# Patient Record
Sex: MALE | Race: WHITE | Employment: UNEMPLOYED | URBAN - METROPOLITAN AREA
[De-identification: names, ages, dates, MRNs, and addresses within clinical notes are randomized per-mention and may not be internally consistent; named-entity substitution may affect disease eponyms.]

---

## 2019-04-01 ENCOUNTER — HOSPITAL ENCOUNTER (INPATIENT)
Age: 49
LOS: 4 days | Discharge: HOME | DRG: 603 | End: 2019-04-05
Attending: EMERGENCY MEDICINE | Admitting: INTERNAL MEDICINE
Payer: MEDICARE

## 2019-04-01 DIAGNOSIS — L03.119 CELLULITIS OF LOWER EXTREMITY, UNSPECIFIED LATERALITY: Primary | ICD-10-CM

## 2019-04-01 DIAGNOSIS — E87.20 LACTIC ACIDOSIS: ICD-10-CM

## 2019-04-01 DIAGNOSIS — K70.31 ALCOHOLIC CIRRHOSIS OF LIVER WITH ASCITES (HCC): ICD-10-CM

## 2019-04-01 DIAGNOSIS — R60.0 BILATERAL LOWER EXTREMITY EDEMA: ICD-10-CM

## 2019-04-01 PROBLEM — L03.116 BILATERAL LOWER LEG CELLULITIS: Status: ACTIVE | Noted: 2019-04-01

## 2019-04-01 PROBLEM — L03.115 BILATERAL LOWER LEG CELLULITIS: Status: ACTIVE | Noted: 2019-04-01

## 2019-04-01 PROBLEM — K74.60 LIVER CIRRHOSIS (HCC): Status: ACTIVE | Noted: 2019-04-01

## 2019-04-01 LAB
A/G RATIO: 0.5 (ref 1.1–2.2)
ALBUMIN SERPL-MCNC: 2.2 G/DL (ref 3.4–5)
ALP BLD-CCNC: 102 U/L (ref 40–129)
ALT SERPL-CCNC: 49 U/L (ref 10–40)
AMMONIA: 47 UMOL/L (ref 16–60)
ANION GAP SERPL CALCULATED.3IONS-SCNC: 7 MMOL/L (ref 3–16)
AST SERPL-CCNC: 82 U/L (ref 15–37)
BASOPHILS ABSOLUTE: 0.1 K/UL (ref 0–0.2)
BASOPHILS RELATIVE PERCENT: 0.9 %
BILIRUB SERPL-MCNC: 1.6 MG/DL (ref 0–1)
BUN BLDV-MCNC: 6 MG/DL (ref 7–20)
CALCIUM SERPL-MCNC: 8.1 MG/DL (ref 8.3–10.6)
CHLORIDE BLD-SCNC: 108 MMOL/L (ref 99–110)
CO2: 24 MMOL/L (ref 21–32)
CREAT SERPL-MCNC: <0.5 MG/DL (ref 0.9–1.3)
EOSINOPHILS ABSOLUTE: 0.1 K/UL (ref 0–0.6)
EOSINOPHILS RELATIVE PERCENT: 1.3 %
GFR AFRICAN AMERICAN: >60
GFR NON-AFRICAN AMERICAN: >60
GLOBULIN: 4.5 G/DL
GLUCOSE BLD-MCNC: 129 MG/DL (ref 70–99)
HCT VFR BLD CALC: 38.1 % (ref 40.5–52.5)
HEMOGLOBIN: 12.1 G/DL (ref 13.5–17.5)
LACTIC ACID, SEPSIS: 1.8 MMOL/L (ref 0.4–1.9)
LACTIC ACID, SEPSIS: 2.7 MMOL/L (ref 0.4–1.9)
LYMPHOCYTES ABSOLUTE: 0.5 K/UL (ref 1–5.1)
LYMPHOCYTES RELATIVE PERCENT: 8.6 %
MCH RBC QN AUTO: 27.6 PG (ref 26–34)
MCHC RBC AUTO-ENTMCNC: 31.7 G/DL (ref 31–36)
MCV RBC AUTO: 87.2 FL (ref 80–100)
MONOCYTES ABSOLUTE: 0.8 K/UL (ref 0–1.3)
MONOCYTES RELATIVE PERCENT: 13.7 %
NEUTROPHILS ABSOLUTE: 4.6 K/UL (ref 1.7–7.7)
NEUTROPHILS RELATIVE PERCENT: 75.5 %
PDW BLD-RTO: 19.2 % (ref 12.4–15.4)
PLATELET # BLD: 108 K/UL (ref 135–450)
PMV BLD AUTO: 10.6 FL (ref 5–10.5)
POTASSIUM SERPL-SCNC: 4.5 MMOL/L (ref 3.5–5.1)
PRO-BNP: 89 PG/ML (ref 0–124)
RBC # BLD: 4.37 M/UL (ref 4.2–5.9)
SODIUM BLD-SCNC: 139 MMOL/L (ref 136–145)
TOTAL PROTEIN: 6.7 G/DL (ref 6.4–8.2)
WBC # BLD: 6 K/UL (ref 4–11)

## 2019-04-01 PROCEDURE — 6360000002 HC RX W HCPCS: Performed by: EMERGENCY MEDICINE

## 2019-04-01 PROCEDURE — 96365 THER/PROPH/DIAG IV INF INIT: CPT

## 2019-04-01 PROCEDURE — 96375 TX/PRO/DX INJ NEW DRUG ADDON: CPT

## 2019-04-01 PROCEDURE — 36415 COLL VENOUS BLD VENIPUNCTURE: CPT

## 2019-04-01 PROCEDURE — 6370000000 HC RX 637 (ALT 250 FOR IP): Performed by: INTERNAL MEDICINE

## 2019-04-01 PROCEDURE — 1200000000 HC SEMI PRIVATE

## 2019-04-01 PROCEDURE — 99284 EMERGENCY DEPT VISIT MOD MDM: CPT

## 2019-04-01 PROCEDURE — 83880 ASSAY OF NATRIURETIC PEPTIDE: CPT

## 2019-04-01 PROCEDURE — 85025 COMPLETE CBC W/AUTO DIFF WBC: CPT

## 2019-04-01 PROCEDURE — 6370000000 HC RX 637 (ALT 250 FOR IP): Performed by: EMERGENCY MEDICINE

## 2019-04-01 PROCEDURE — 83605 ASSAY OF LACTIC ACID: CPT

## 2019-04-01 PROCEDURE — 82140 ASSAY OF AMMONIA: CPT

## 2019-04-01 PROCEDURE — 87040 BLOOD CULTURE FOR BACTERIA: CPT

## 2019-04-01 PROCEDURE — 96367 TX/PROPH/DG ADDL SEQ IV INF: CPT

## 2019-04-01 PROCEDURE — 2580000003 HC RX 258: Performed by: EMERGENCY MEDICINE

## 2019-04-01 PROCEDURE — 80053 COMPREHEN METABOLIC PANEL: CPT

## 2019-04-01 PROCEDURE — 6360000002 HC RX W HCPCS: Performed by: INTERNAL MEDICINE

## 2019-04-01 PROCEDURE — 2580000003 HC RX 258: Performed by: INTERNAL MEDICINE

## 2019-04-01 RX ORDER — SODIUM CHLORIDE 0.9 % (FLUSH) 0.9 %
10 SYRINGE (ML) INJECTION PRN
Status: DISCONTINUED | OUTPATIENT
Start: 2019-04-01 | End: 2019-04-05 | Stop reason: HOSPADM

## 2019-04-01 RX ORDER — NADOLOL 20 MG/1
20 TABLET ORAL DAILY
Status: DISCONTINUED | OUTPATIENT
Start: 2019-04-02 | End: 2019-04-05 | Stop reason: HOSPADM

## 2019-04-01 RX ORDER — FUROSEMIDE 40 MG/1
40 TABLET ORAL DAILY
Status: DISCONTINUED | OUTPATIENT
Start: 2019-04-02 | End: 2019-04-02

## 2019-04-01 RX ORDER — THIAMINE MONONITRATE (VIT B1) 100 MG
100 TABLET ORAL DAILY
Status: DISCONTINUED | OUTPATIENT
Start: 2019-04-02 | End: 2019-04-05 | Stop reason: HOSPADM

## 2019-04-01 RX ORDER — SPIRONOLACTONE 100 MG/1
100 TABLET, FILM COATED ORAL DAILY
Status: ON HOLD | COMMUNITY
End: 2019-04-04 | Stop reason: SDUPTHER

## 2019-04-01 RX ORDER — LACTULOSE 10 G/15ML
20 SOLUTION ORAL 3 TIMES DAILY
Status: ON HOLD | COMMUNITY
End: 2019-04-04 | Stop reason: SDUPTHER

## 2019-04-01 RX ORDER — PANTOPRAZOLE SODIUM 40 MG/1
40 TABLET, DELAYED RELEASE ORAL DAILY
Status: DISCONTINUED | OUTPATIENT
Start: 2019-04-02 | End: 2019-04-05 | Stop reason: HOSPADM

## 2019-04-01 RX ORDER — NADOLOL 20 MG/1
20 TABLET ORAL DAILY
Status: ON HOLD | COMMUNITY
End: 2019-04-04 | Stop reason: SDUPTHER

## 2019-04-01 RX ORDER — TRAMADOL HYDROCHLORIDE 50 MG/1
100 TABLET ORAL EVERY 6 HOURS PRN
Status: DISCONTINUED | OUTPATIENT
Start: 2019-04-01 | End: 2019-04-02

## 2019-04-01 RX ORDER — PANTOPRAZOLE SODIUM 40 MG/1
40 TABLET, DELAYED RELEASE ORAL DAILY
Status: ON HOLD | COMMUNITY
End: 2019-04-04 | Stop reason: SDUPTHER

## 2019-04-01 RX ORDER — SPIRONOLACTONE 25 MG/1
100 TABLET ORAL DAILY
Status: DISCONTINUED | OUTPATIENT
Start: 2019-04-02 | End: 2019-04-05 | Stop reason: HOSPADM

## 2019-04-01 RX ORDER — SODIUM CHLORIDE 0.9 % (FLUSH) 0.9 %
10 SYRINGE (ML) INJECTION EVERY 12 HOURS SCHEDULED
Status: DISCONTINUED | OUTPATIENT
Start: 2019-04-01 | End: 2019-04-05 | Stop reason: HOSPADM

## 2019-04-01 RX ORDER — LACTULOSE 10 G/15ML
20 SOLUTION ORAL 3 TIMES DAILY
Status: DISCONTINUED | OUTPATIENT
Start: 2019-04-01 | End: 2019-04-05 | Stop reason: HOSPADM

## 2019-04-01 RX ORDER — FUROSEMIDE 40 MG/1
40 TABLET ORAL DAILY
Status: ON HOLD | COMMUNITY
End: 2019-04-04 | Stop reason: SDUPTHER

## 2019-04-01 RX ORDER — ACETAMINOPHEN 325 MG/1
650 TABLET ORAL EVERY 4 HOURS PRN
Status: DISCONTINUED | OUTPATIENT
Start: 2019-04-01 | End: 2019-04-05 | Stop reason: HOSPADM

## 2019-04-01 RX ORDER — OXYCODONE HYDROCHLORIDE AND ACETAMINOPHEN 5; 325 MG/1; MG/1
1 TABLET ORAL ONCE
Status: COMPLETED | OUTPATIENT
Start: 2019-04-01 | End: 2019-04-01

## 2019-04-01 RX ORDER — ONDANSETRON 2 MG/ML
4 INJECTION INTRAMUSCULAR; INTRAVENOUS EVERY 4 HOURS PRN
Status: DISCONTINUED | OUTPATIENT
Start: 2019-04-01 | End: 2019-04-05 | Stop reason: HOSPADM

## 2019-04-01 RX ORDER — THIAMINE MONONITRATE (VIT B1) 100 MG
100 TABLET ORAL DAILY
Status: ON HOLD | COMMUNITY
End: 2019-04-04 | Stop reason: SDUPTHER

## 2019-04-01 RX ORDER — TRAMADOL HYDROCHLORIDE 50 MG/1
50 TABLET ORAL EVERY 6 HOURS PRN
Status: DISCONTINUED | OUTPATIENT
Start: 2019-04-01 | End: 2019-04-02

## 2019-04-01 RX ORDER — FUROSEMIDE 10 MG/ML
20 INJECTION INTRAMUSCULAR; INTRAVENOUS ONCE
Status: COMPLETED | OUTPATIENT
Start: 2019-04-01 | End: 2019-04-01

## 2019-04-01 RX ADMIN — HYDROMORPHONE HYDROCHLORIDE 0.5 MG: 1 INJECTION, SOLUTION INTRAMUSCULAR; INTRAVENOUS; SUBCUTANEOUS at 17:52

## 2019-04-01 RX ADMIN — TRAMADOL HYDROCHLORIDE 100 MG: 50 TABLET ORAL at 19:37

## 2019-04-01 RX ADMIN — VANCOMYCIN HYDROCHLORIDE 1250 MG: 5 INJECTION, POWDER, LYOPHILIZED, FOR SOLUTION INTRAVENOUS at 17:17

## 2019-04-01 RX ADMIN — LACTULOSE 20 G: 20 SOLUTION ORAL at 21:40

## 2019-04-01 RX ADMIN — FUROSEMIDE 20 MG: 10 INJECTION, SOLUTION INTRAMUSCULAR; INTRAVENOUS at 15:51

## 2019-04-01 RX ADMIN — OXYCODONE HYDROCHLORIDE AND ACETAMINOPHEN 1 TABLET: 5; 325 TABLET ORAL at 15:51

## 2019-04-01 RX ADMIN — RIFAXIMIN 550 MG: 550 TABLET ORAL at 21:40

## 2019-04-01 RX ADMIN — PIPERACILLIN SODIUM,TAZOBACTAM SODIUM 3.38 G: 3; .375 INJECTION, POWDER, FOR SOLUTION INTRAVENOUS at 21:40

## 2019-04-01 RX ADMIN — CEFTRIAXONE 1 G: 1 INJECTION, POWDER, FOR SOLUTION INTRAMUSCULAR; INTRAVENOUS at 15:51

## 2019-04-01 RX ADMIN — Medication 10 ML: at 21:40

## 2019-04-01 ASSESSMENT — PAIN DESCRIPTION - LOCATION
LOCATION: LEG

## 2019-04-01 ASSESSMENT — PAIN DESCRIPTION - ORIENTATION
ORIENTATION: RIGHT;LEFT
ORIENTATION: LEFT;RIGHT;LOWER
ORIENTATION: RIGHT;LEFT

## 2019-04-01 ASSESSMENT — PAIN - FUNCTIONAL ASSESSMENT
PAIN_FUNCTIONAL_ASSESSMENT: PREVENTS OR INTERFERES WITH ALL ACTIVE AND SOME PASSIVE ACTIVITIES
PAIN_FUNCTIONAL_ASSESSMENT: PREVENTS OR INTERFERES SOME ACTIVE ACTIVITIES AND ADLS

## 2019-04-01 ASSESSMENT — PAIN DESCRIPTION - FREQUENCY
FREQUENCY: CONTINUOUS

## 2019-04-01 ASSESSMENT — PAIN DESCRIPTION - ONSET
ONSET: ON-GOING
ONSET: GRADUAL
ONSET: ON-GOING
ONSET: ON-GOING

## 2019-04-01 ASSESSMENT — PAIN SCALES - GENERAL
PAINLEVEL_OUTOF10: 10
PAINLEVEL_OUTOF10: 6
PAINLEVEL_OUTOF10: 10
PAINLEVEL_OUTOF10: 10
PAINLEVEL_OUTOF10: 7
PAINLEVEL_OUTOF10: 7

## 2019-04-01 ASSESSMENT — PAIN DESCRIPTION - PAIN TYPE
TYPE: ACUTE PAIN

## 2019-04-01 ASSESSMENT — PAIN DESCRIPTION - PROGRESSION
CLINICAL_PROGRESSION: GRADUALLY WORSENING
CLINICAL_PROGRESSION: GRADUALLY IMPROVING
CLINICAL_PROGRESSION: GRADUALLY WORSENING

## 2019-04-01 ASSESSMENT — PAIN DESCRIPTION - DESCRIPTORS
DESCRIPTORS: BURNING
DESCRIPTORS: BURNING;PINS AND NEEDLES
DESCRIPTORS: BURNING

## 2019-04-01 NOTE — PROGRESS NOTES
Medication Reconciliation    List of medications patient is currently taking is complete. Source of information: 1. Conversation with patient at bedside                                       2. EPIC records      Allergies  Patient has no known allergies. Notes regarding home medications:   1. Patient has not taken any of his home medications for the past several weeks.       Temo Bentley PharmD, BCPS  4/1/2019 4:52 PM

## 2019-04-01 NOTE — ED TRIAGE NOTES
Pt c/o lower leg pain pt has blistering skin from the knee down on both legs. Per pt his legs have been this way for around a month or month and a half. Pt does not know how this condition started but states that it is getting worse. Pt states that he does not have a PCP. Pt is currently alert and oriented x 4. Pt is answering questions approprietly, in full sentences without difficulty or trouble breathing. Pt is currently resting in bed in supine position. Pt VS are as noted.

## 2019-04-01 NOTE — ED PROVIDER NOTES
Triage Chief Complaint:   Leg Pain (Patient states he has psoriasis and for the last month he has had increased pain to bilateral legs with seeping from wounds.  )    [place]:  Valeriano Lagunas is a 46 y.o. male that presents with gradually increasing bilateral peripheral edema of the lower extremities with erythema and open wounds on both legs. He has a history of hepatic cirrhosis and does have chronic edema of the lower extremity is, but has been getting worse. He is having oozing from bilateral lower extremities with open sores. The skin is now burning and he has severe pain in both legs diffusely. He states he has not been taking his home medications. He does have a history of hyperammonemia but states that he has not had any confusion or balance problems, which are the symptoms that he had previously. He denies nausea or vomiting, chest pain, shortness of breath, fever, cough, or any other new complaints at this time. ROS:  At least 10 systems reviewed and otherwise acutely negative except as in the [address] Past Medical History:   Diagnosis Date    Cirrhosis [facility])      Past Surgical History:   Procedure Laterality Date    HERNIA REPAIR       History reviewed. No pertinent family history.   Social History     Socioeconomic History    Marital status: Single     Spouse name: Not on file    Number of children: Not on file    Years of education: Not on file    Highest education level: Not on file   Occupational History    Not on file   Social Needs    Financial resource strain: Not on file    Food insecurity:     Worry: Not on file     Inability: Not on file    Transportation needs:     Medical: Not on file     Non-medical: Not on file   Tobacco Use    Smoking status: Former Smoker    Smokeless tobacco: Never Used   Substance and Sexual Activity    Alcohol use: No    Drug use: No    Sexual activity: Not on file   Lifestyle    Physical activity:     Days per week: Not on file     Minutes per ondansetron (ZOFRAN) injection 4 mg  4 mg Intravenous Q4H PRN Christen Loya MD        [START ON 4/2/2019] enoxaparin (LOVENOX) injection 40 mg  40 mg Subcutaneous Daily Christen Loya MD        acetaminophen (TYLENOL) tablet 650 mg  650 mg Oral Q4H PRN Christen Loya MD        traMADol Dulce Maria Peto) tablet 50 mg  50 mg Oral Q6H PRN Christen Loya MD        Or    traMADol Dulce Maria Peto) tablet 100 mg  100 mg Oral Q6H PRN Christen Loya MD   100 mg at 04/01/19 1937    vancomycin (VANCOCIN) intermittent dosing (placeholder)   Other RX Placeholder Christen Loya MD        [START ON 4/2/2019] vancomycin (VANCOCIN) 1250 mg in dextrose 5 % 250 mL IVPB  1,250 mg Intravenous Q8H Christen Loya MD         No Known Allergies     Tamiko Coma Scale  Eye Opening: Spontaneous  Best Verbal Response: Oriented  Best Motor Response: Obeys commands  Tamiko Coma Scale Score: 15      Nursing Notes Reviewed    Physical Exam:  Vitals:    04/01/19 2049   BP: 139/69   Pulse: 84   Resp: 16   Temp: 97.6 °F (36.4 °C)   SpO2: 98%       GENERAL APPEARANCE: Awake and alert. Cooperative. He appears uncomfortable and frequently grimaces. No other distress. HEAD: Normocephalic. Atraumatic. EYES: EOM's grossly intact. Sclera anicteric. ENT: Mucous membranes are moist. Tolerates saliva. No trismus. NECK: Supple. No meningismus. Trachea midline. HEART: RRR. Radial pulses 2+. LUNGS: Respirations unlabored. CTAB  ABDOMEN: Soft. Non-tender. No guarding or rebound. EXTREMITIES: Severe pitting edema bilateral extremities. There are diffuse excoriation areas of the lower extremities with cellulitis and erythema. Skin is hot to the touch. There is some serous fluid draining from the skin. Sstrong posterior tibial pulse bilaterally. SKIN: Warm and dry. Areas of cellulitis and excoriation with scab lesions as mentioned above. NEUROLOGICAL: No gross facial drooping. Moves all 4 extremities spontaneously. PSYCHIATRIC: Normal mood.     I have reviewed and interpreted all of the currently available lab results from this visit (if applicable):  Results for orders placed or performed during the hospital encounter of 04/01/19   Ammonia   Result Value Ref Range    Ammonia 47 16 - 60 umol/L   CBC Auto Differential   Result Value Ref Range    WBC 6.0 4.0 - 11.0 K/uL    RBC 4.37 4.20 - 5.90 M/uL    Hemoglobin 12.1 (L) 13.5 - 17.5 g/dL    Hematocrit 38.1 (L) 40.5 - 52.5 %    MCV 87.2 80.0 - 100.0 fL    MCH 27.6 26.0 - 34.0 pg    MCHC 31.7 31.0 - 36.0 g/dL    RDW 19.2 (H) 12.4 - 15.4 %    Platelets 983 (L) 123 - 450 K/uL    MPV 10.6 (H) 5.0 - 10.5 fL    Neutrophils % 75.5 %    Lymphocytes % 8.6 %    Monocytes % 13.7 %    Eosinophils % 1.3 %    Basophils % 0.9 %    Neutrophils # 4.6 1.7 - 7.7 K/uL    Lymphocytes # 0.5 (L) 1.0 - 5.1 K/uL    Monocytes # 0.8 0.0 - 1.3 K/uL    Eosinophils # 0.1 0.0 - 0.6 K/uL    Basophils # 0.1 0.0 - 0.2 K/uL   Comprehensive Metabolic Panel   Result Value Ref Range    Sodium 139 136 - 145 mmol/L    Potassium 4.5 3.5 - 5.1 mmol/L    Chloride 108 99 - 110 mmol/L    CO2 24 21 - 32 mmol/L    Anion Gap 7 3 - 16    Glucose 129 (H) 70 - 99 mg/dL    BUN 6 (L) 7 - 20 mg/dL    CREATININE <0.5 (L) 0.9 - 1.3 mg/dL    GFR Non-African American >60 >60    GFR African American >60 >60    Calcium 8.1 (L) 8.3 - 10.6 mg/dL    Total Protein 6.7 6.4 - 8.2 g/dL    Alb 2.2 (L) 3.4 - 5.0 g/dL    Albumin/Globulin Ratio 0.5 (L) 1.1 - 2.2    Total Bilirubin 1.6 (H) 0.0 - 1.0 mg/dL    Alkaline Phosphatase 102 40 - 129 U/L    ALT 49 (H) 10 - 40 U/L    AST 82 (H) 15 - 37 U/L    Globulin 4.5 g/dL   Brain Natriuretic Peptide   Result Value Ref Range    Pro-BNP 89 0 - 124 pg/mL   Lactate, Sepsis   Result Value Ref Range    Lactic Acid, Sepsis 2.7 (H) 0.4 - 1.9 mmol/L   Lactate, Sepsis   Result Value Ref Range    Lactic Acid, Sepsis 1.8 0.4 - 1.9 mmol/L        Radiographs (if obtained):  [] The following radiograph was interpreted by myself in the absence of a radiologist:  [] Radiologist's Report Reviewed:      EKG (if obtained): (All EKG's are interpreted by myself in the absence of a cardiologist)      MDM:  Differential diagnosis: Likely cellulitis with edema from cirrhosis versus renal insufficiency. I do not suspect DVT as bilateral extremities are involved with evidence of cellulitis and diffuse erythema. Patient has significant cellulitis and bruising of bilateral lower extremities from the lower legs almost up to the knees bilaterally. It is very hot to the touch and tender. Labs and IV vancomycin and Rocephin ordered. Old records reviewed. Labsreviewed and results discussed with patient. Pain was not controllable Percocets of Dilaudid was ordered. Lactic acid is elevated to 2.7. He does have cellulitis, but has no leukocytosis or other sepsis/surgical criteria. Given his significant fluid overload status we will hold IV fluids at this time. Patient was given scripts for the following medications. I counseled patient how to take these medications. Current Discharge Medication List            Clinical Impression:  1. Cellulitis of lower extremity, unspecified laterality    2. Lactic acidosis    3.  Bilateral lower extremity edema       (Please note that portions of this note may have been completed with a voice recognition program. Efforts were made to edit the dictations but occasionally words are mis-transcribed.)    MD Maximino Walton MD  04/01/19 4183

## 2019-04-01 NOTE — H&P
Hospital Medicine  History and Physical    PCP: Erlinda Ross MD  Patient Name: Priyank Patel    Date of Service: Pt seen/examined on 04/01/2019 and Admitted to Inpatient with expected LOS greater than two midnights due to medical therapy    CHIEF COMPLAINT:  Pt c/o pain, erythema bilateral lower extremities  HISTORY OF PRESENT ILLNESS: Patient is a 78-year-old man with a history of liver cirrhosis who presents to the emergency room for evaluation following a 4 to 6 week history of increasing blistering, swelling and erythema of his bilateral lower extremities. He states that he has used Neosporin and peroxide and neither have helped. In the emergency room he was found to have bilateral lower extremity cellulitis and is being admitted for further evaluation and treatment. Associated signs and symptoms do not include fever or chills, nausea, vomiting, abdominal pain, hemoptysis, hematochezia, diarrhea, constipation or urinary symptoms. Past Medical History:        Diagnosis Date    Cirrhosis [facility])        Past Surgical History:        Procedure Laterality Date    HERNIA REPAIR         Allergies:  Patient has no known allergies. Medications Prior to Admission:    Prior to Admission medications    Medication Sig Start Date End Date Taking?  Authorizing Provider   lactulose (CHRONULAC) 10 GM/15ML solution Take 20 g by mouth 3 times daily   Yes Historical Provider, MD   pantoprazole (PROTONIX) 40 MG tablet Take 40 mg by mouth daily   Yes Historical Provider, MD   nadolol (CORGARD) 20 MG tablet Take 20 mg by mouth daily   Yes Historical Provider, MD   vitamin B-1 (THIAMINE) 100 MG tablet Take 100 mg by mouth daily   Yes Historical Provider, MD   spironolactone (ALDACTONE) 100 MG tablet Take 100 mg by mouth daily   Yes Historical Provider, MD   rifaximin (XIFAXAN) 550 MG tablet Take 550 mg by mouth 2 times daily   Yes Historical Provider, MD   furosemide (LASIX) 40 MG tablet Take 40 mg by mouth daily   Yes Historical Provider, MD       Family History:   Family history is negative for accelerated coronary artery disease, diabetes or malignancies. Social History:   TOBACCO:   reports that he has quit smoking. He has never used smokeless tobacco.  ETOH:   reports that he does not drink alcohol. OCCUPATION:      REVIEW OF SYSTEMS:  A full review of systems was performed and is negative except for that which appears in the HPI    Physical Exam:    Vitals: BP (!) 142/78   Pulse 80   Temp 98.5 °F (36.9 °C) (Oral)   Resp 15   Ht 5' 10\" (1.778 m)   Wt 185 lb (83.9 kg)   SpO2 100%   BMI 26.54 kg/m²   General appearance: WD/WN 46y.o. year-old  male who is alert, appears stated age and is cooperative  HEENT: Head: Normocephalic, no lesions, without obvious abnormality. Eye: Normal external eye, conjunctiva, lids cornea, SUELLEN. Ears: Normal external ears. Non-tender. Nose: Normal external nose, mucus membranes and septum. Pharynx: Dental Hygiene adequate. Normal buccal mucosa. Normal pharynx. Neck: no adenopathy, no carotid bruit, no JVD, supple, symmetrical, trachea midline and thyroid not enlarged, symmetric, no tenderness/mass/nodules  Lungs: clear to auscultation bilaterally and no use of accessory muscles. Heart: regular rate and rhythm, S1, S2 normal, no murmur, click, rub or gallop and normal apical impulse  Abdomen: soft, non-tender; bowel sounds normal; no masses,  no organomegaly  Extremities: Swelling, blistering, erythema bilateral lower extremities, otherwise Homans sign is negative, no sign of DVT. Capillary Refill: Acceptable < 3 seconds  Peripheral Pulses: +3 easily felt, not easily obliterated with pressures  Skin: As above, otherwise skin color, texture, turgor normal. No rashes or lesions on exposed skin  Neurologic: Neurovascularly intact without any focal sensory/motor deficits. Cranial nerves: II-XII intact, grossly non-focal. Gait was not tested.   Psychiatric: Alert and oriented, thought content appropriate, normal insight    CBC:   Recent Labs     04/01/19  1539   WBC 6.0   HGB 12.1*   *     BMP:    Recent Labs     04/01/19  1539      K 4.5      CO2 24   BUN 6*   CREATININE <0.5*   GLUCOSE 129*     Troponin: No results for input(s): TROPONINI in the last 72 hours. PT/INR:  No results found for: PTINR  U/A:    Lab Results   Component Value Date    LEUKOCYTESUR Negative 06/08/2015    RBCUA 11 06/08/2015    SPECGRAV 1.012 06/08/2015    UROBILINOGEN 1.0 06/08/2015    BILIRUBINUR Negative 06/08/2015    BILIRUBINUR Moderate 07/25/2011    BLOODU SMALL 06/08/2015    GLUCOSEU Negative 06/08/2015    GLUCOSEU Negative 07/25/2011    PROTEINU Negative 06/08/2015         Assessment:   Principal Problem:    Bilateral lower leg cellulitis  Active Problems:    Liver cirrhosis ([address]  Resolved Problems:    * No resolved hospital problems. *      Plan:       Bilateral lower leg cellulitis - Pt started on Vancomycin and Zosyn. Blood cultures x 2 and ID consulted    Liver cirrhosis (HCC) continue Rifaximin, Lactulose, Nadolol        DVT Prophylaxis: Lovenox  Diet: No diet orders on file  Code Status: No Order  (Advanced care planning has been discussed with patient and/or responsible family member and is reflected in the code status. Further orders associated with this have been entered if appropriate)    Disposition: Anticipate that patient will remain in the hospital for 2 to 3+ days depending on further evaluation and clinical course.      Gabriela Colorado MD

## 2019-04-02 LAB
ANION GAP SERPL CALCULATED.3IONS-SCNC: 6 MMOL/L (ref 3–16)
BASOPHILS ABSOLUTE: 0 K/UL (ref 0–0.2)
BASOPHILS RELATIVE PERCENT: 0.9 %
BUN BLDV-MCNC: 7 MG/DL (ref 7–20)
CALCIUM SERPL-MCNC: 7.1 MG/DL (ref 8.3–10.6)
CHLORIDE BLD-SCNC: 105 MMOL/L (ref 99–110)
CO2: 22 MMOL/L (ref 21–32)
CREAT SERPL-MCNC: <0.5 MG/DL (ref 0.9–1.3)
EOSINOPHILS ABSOLUTE: 0.1 K/UL (ref 0–0.6)
EOSINOPHILS RELATIVE PERCENT: 2.8 %
GFR AFRICAN AMERICAN: >60
GFR NON-AFRICAN AMERICAN: >60
GLUCOSE BLD-MCNC: 124 MG/DL (ref 70–99)
HCT VFR BLD CALC: 33.7 % (ref 40.5–52.5)
HEMOGLOBIN: 10.9 G/DL (ref 13.5–17.5)
LYMPHOCYTES ABSOLUTE: 0.5 K/UL (ref 1–5.1)
LYMPHOCYTES RELATIVE PERCENT: 13.4 %
MCH RBC QN AUTO: 28.5 PG (ref 26–34)
MCHC RBC AUTO-ENTMCNC: 32.5 G/DL (ref 31–36)
MCV RBC AUTO: 87.7 FL (ref 80–100)
MONOCYTES ABSOLUTE: 0.5 K/UL (ref 0–1.3)
MONOCYTES RELATIVE PERCENT: 14.3 %
NEUTROPHILS ABSOLUTE: 2.6 K/UL (ref 1.7–7.7)
NEUTROPHILS RELATIVE PERCENT: 68.6 %
PDW BLD-RTO: 19 % (ref 12.4–15.4)
PLATELET # BLD: 94 K/UL (ref 135–450)
PMV BLD AUTO: 10.5 FL (ref 5–10.5)
POTASSIUM REFLEX MAGNESIUM: 3.7 MMOL/L (ref 3.5–5.1)
RBC # BLD: 3.84 M/UL (ref 4.2–5.9)
SODIUM BLD-SCNC: 133 MMOL/L (ref 136–145)
WBC # BLD: 3.8 K/UL (ref 4–11)

## 2019-04-02 PROCEDURE — 6370000000 HC RX 637 (ALT 250 FOR IP): Performed by: INTERNAL MEDICINE

## 2019-04-02 PROCEDURE — 1200000000 HC SEMI PRIVATE

## 2019-04-02 PROCEDURE — 85025 COMPLETE CBC W/AUTO DIFF WBC: CPT

## 2019-04-02 PROCEDURE — 2580000003 HC RX 258: Performed by: INTERNAL MEDICINE

## 2019-04-02 PROCEDURE — 80048 BASIC METABOLIC PNL TOTAL CA: CPT

## 2019-04-02 PROCEDURE — 6360000002 HC RX W HCPCS: Performed by: NURSE PRACTITIONER

## 2019-04-02 PROCEDURE — 36415 COLL VENOUS BLD VENIPUNCTURE: CPT

## 2019-04-02 PROCEDURE — 6360000002 HC RX W HCPCS: Performed by: INTERNAL MEDICINE

## 2019-04-02 PROCEDURE — 94760 N-INVAS EAR/PLS OXIMETRY 1: CPT

## 2019-04-02 PROCEDURE — 89051 BODY FLUID CELL COUNT: CPT

## 2019-04-02 PROCEDURE — 6370000000 HC RX 637 (ALT 250 FOR IP): Performed by: NURSE PRACTITIONER

## 2019-04-02 PROCEDURE — 93970 EXTREMITY STUDY: CPT

## 2019-04-02 PROCEDURE — 99223 1ST HOSP IP/OBS HIGH 75: CPT | Performed by: INTERNAL MEDICINE

## 2019-04-02 RX ORDER — OXYCODONE HYDROCHLORIDE AND ACETAMINOPHEN 5; 325 MG/1; MG/1
2 TABLET ORAL EVERY 4 HOURS PRN
Status: DISCONTINUED | OUTPATIENT
Start: 2019-04-02 | End: 2019-04-05 | Stop reason: HOSPADM

## 2019-04-02 RX ORDER — KETOROLAC TROMETHAMINE 30 MG/ML
30 INJECTION, SOLUTION INTRAMUSCULAR; INTRAVENOUS ONCE
Status: COMPLETED | OUTPATIENT
Start: 2019-04-02 | End: 2019-04-02

## 2019-04-02 RX ORDER — HYDROCODONE BITARTRATE AND ACETAMINOPHEN 5; 325 MG/1; MG/1
1 TABLET ORAL EVERY 6 HOURS PRN
Status: DISCONTINUED | OUTPATIENT
Start: 2019-04-02 | End: 2019-04-02

## 2019-04-02 RX ORDER — KETOROLAC TROMETHAMINE 15 MG/ML
15 INJECTION, SOLUTION INTRAMUSCULAR; INTRAVENOUS ONCE
Status: DISCONTINUED | OUTPATIENT
Start: 2019-04-02 | End: 2019-04-02

## 2019-04-02 RX ORDER — GABAPENTIN 100 MG/1
100 CAPSULE ORAL 3 TIMES DAILY
Status: DISCONTINUED | OUTPATIENT
Start: 2019-04-02 | End: 2019-04-05 | Stop reason: HOSPADM

## 2019-04-02 RX ORDER — FUROSEMIDE 10 MG/ML
40 INJECTION INTRAMUSCULAR; INTRAVENOUS 2 TIMES DAILY
Status: DISCONTINUED | OUTPATIENT
Start: 2019-04-02 | End: 2019-04-03

## 2019-04-02 RX ADMIN — Medication 10 ML: at 21:17

## 2019-04-02 RX ADMIN — GABAPENTIN 100 MG: 100 CAPSULE ORAL at 12:20

## 2019-04-02 RX ADMIN — OXYCODONE AND ACETAMINOPHEN 2 TABLET: 5; 325 TABLET ORAL at 17:59

## 2019-04-02 RX ADMIN — TRAMADOL HYDROCHLORIDE 100 MG: 50 TABLET ORAL at 08:32

## 2019-04-02 RX ADMIN — LACTULOSE 20 G: 20 SOLUTION ORAL at 12:21

## 2019-04-02 RX ADMIN — Medication 100 MG: at 08:32

## 2019-04-02 RX ADMIN — HYDROCODONE BITARTRATE AND ACETAMINOPHEN 1 TABLET: 5; 325 TABLET ORAL at 13:25

## 2019-04-02 RX ADMIN — NADOLOL 20 MG: 20 TABLET ORAL at 08:44

## 2019-04-02 RX ADMIN — ENOXAPARIN SODIUM 40 MG: 40 INJECTION SUBCUTANEOUS at 08:32

## 2019-04-02 RX ADMIN — FUROSEMIDE 40 MG: 40 TABLET ORAL at 08:33

## 2019-04-02 RX ADMIN — RIFAXIMIN 550 MG: 550 TABLET ORAL at 21:16

## 2019-04-02 RX ADMIN — GABAPENTIN 100 MG: 100 CAPSULE ORAL at 21:16

## 2019-04-02 RX ADMIN — VANCOMYCIN HYDROCHLORIDE 1250 MG: 10 INJECTION, POWDER, LYOPHILIZED, FOR SOLUTION INTRAVENOUS at 04:39

## 2019-04-02 RX ADMIN — SPIRONOLACTONE 100 MG: 25 TABLET ORAL at 08:32

## 2019-04-02 RX ADMIN — PIPERACILLIN SODIUM,TAZOBACTAM SODIUM 3.38 G: 3; .375 INJECTION, POWDER, FOR SOLUTION INTRAVENOUS at 03:36

## 2019-04-02 RX ADMIN — LACTULOSE 20 G: 20 SOLUTION ORAL at 21:16

## 2019-04-02 RX ADMIN — Medication 10 ML: at 08:45

## 2019-04-02 RX ADMIN — VANCOMYCIN HYDROCHLORIDE 1000 MG: 1 INJECTION, POWDER, LYOPHILIZED, FOR SOLUTION INTRAVENOUS at 18:38

## 2019-04-02 RX ADMIN — FUROSEMIDE 40 MG: 10 INJECTION, SOLUTION INTRAMUSCULAR; INTRAVENOUS at 12:19

## 2019-04-02 RX ADMIN — CEFTRIAXONE 2 G: 2 INJECTION, POWDER, FOR SOLUTION INTRAMUSCULAR; INTRAVENOUS at 16:56

## 2019-04-02 RX ADMIN — FUROSEMIDE 40 MG: 10 INJECTION, SOLUTION INTRAMUSCULAR; INTRAVENOUS at 16:56

## 2019-04-02 RX ADMIN — LACTULOSE 20 G: 20 SOLUTION ORAL at 08:32

## 2019-04-02 RX ADMIN — PIPERACILLIN SODIUM,TAZOBACTAM SODIUM 3.38 G: 3; .375 INJECTION, POWDER, FOR SOLUTION INTRAVENOUS at 08:34

## 2019-04-02 RX ADMIN — PANTOPRAZOLE SODIUM 40 MG: 40 TABLET, DELAYED RELEASE ORAL at 08:33

## 2019-04-02 RX ADMIN — KETOROLAC TROMETHAMINE 30 MG: 30 INJECTION, SOLUTION INTRAMUSCULAR; INTRAVENOUS at 21:16

## 2019-04-02 RX ADMIN — RIFAXIMIN 550 MG: 550 TABLET ORAL at 08:32

## 2019-04-02 RX ADMIN — TRAMADOL HYDROCHLORIDE 100 MG: 50 TABLET ORAL at 01:41

## 2019-04-02 ASSESSMENT — PAIN DESCRIPTION - LOCATION
LOCATION: LEG

## 2019-04-02 ASSESSMENT — PAIN DESCRIPTION - ONSET
ONSET: GRADUAL
ONSET: GRADUAL
ONSET: AWAKENED FROM SLEEP

## 2019-04-02 ASSESSMENT — PAIN SCALES - GENERAL
PAINLEVEL_OUTOF10: 3
PAINLEVEL_OUTOF10: 9
PAINLEVEL_OUTOF10: 8
PAINLEVEL_OUTOF10: 4
PAINLEVEL_OUTOF10: 9
PAINLEVEL_OUTOF10: 1
PAINLEVEL_OUTOF10: 9
PAINLEVEL_OUTOF10: 3
PAINLEVEL_OUTOF10: 8
PAINLEVEL_OUTOF10: 6
PAINLEVEL_OUTOF10: 6
PAINLEVEL_OUTOF10: 2
PAINLEVEL_OUTOF10: 6
PAINLEVEL_OUTOF10: 4

## 2019-04-02 ASSESSMENT — PAIN DESCRIPTION - DESCRIPTORS
DESCRIPTORS: BURNING
DESCRIPTORS: BURNING
DESCRIPTORS: CONSTANT

## 2019-04-02 ASSESSMENT — PAIN DESCRIPTION - PAIN TYPE
TYPE: ACUTE PAIN

## 2019-04-02 ASSESSMENT — PAIN DESCRIPTION - PROGRESSION
CLINICAL_PROGRESSION: GRADUALLY WORSENING
CLINICAL_PROGRESSION: GRADUALLY WORSENING
CLINICAL_PROGRESSION: GRADUALLY IMPROVING

## 2019-04-02 ASSESSMENT — PAIN DESCRIPTION - FREQUENCY
FREQUENCY: INTERMITTENT
FREQUENCY: INTERMITTENT
FREQUENCY: CONTINUOUS

## 2019-04-02 ASSESSMENT — PAIN DESCRIPTION - ORIENTATION
ORIENTATION: RIGHT;LEFT
ORIENTATION: RIGHT;LEFT

## 2019-04-02 ASSESSMENT — PAIN - FUNCTIONAL ASSESSMENT
PAIN_FUNCTIONAL_ASSESSMENT: PREVENTS OR INTERFERES SOME ACTIVE ACTIVITIES AND ADLS

## 2019-04-02 NOTE — PLAN OF CARE
Problem: Infection:  Goal: Will remain free from infection  Description  Will remain free from infection  Outcome: Ongoing     Problem: Safety:  Goal: Free from accidental physical injury  Description  Free from accidental physical injury  Outcome: Ongoing  Goal: Free from intentional harm  Description  Free from intentional harm  Outcome: Ongoing     Problem: Daily Care:  Goal: Daily care needs are met  Description  Daily care needs are met  Outcome: Ongoing     Problem: Pain:  Goal: Patient's pain/discomfort is manageable  Description  Patient's pain/discomfort is manageable  Outcome: Ongoing     Problem: Skin Integrity:  Goal: Skin integrity will stabilize  Description  Skin integrity will stabilize  Outcome: Ongoing

## 2019-04-02 NOTE — PROGRESS NOTES
Hospital Medicine Progress Note      Admit Date: 4/1/2019         Overnight Events: continues to have pain in BLE    CC: F/U for BLE pain and swelling    HPI:   This is a 49-year-old male with a history of liver cirrhosis and alcohol use who presented to the ED with complaints of worsening pain and redness in his bilateral lower extremities. He states that his symptoms have been present for approximately one and half months now. He said no fevers or chills. He states that he's been \"out of my meds for a few months now. \" He also states that he \"needs a paracentesis. \"     Interval History/Subjective:   Complains of burning in his bilateral lower extremities with drainage, complaints of abdominal distention but no nausea or vomiting. Denies fevers or chills, no chest pain or shortness of breath, no palpitations or wheezing. Review of Systems:     Comprehensive ROS negative except as mentioned above. Past Medical History:        Diagnosis Date    Cirrhosis [facility])        Past Surgical History:        Procedure Laterality Date    HERNIA REPAIR         Allergies:  Patient has no known allergies. Past medical and surgical history reviewed. Any changes have been noted. Scheduled and prn Medications:    Scheduled Meds:   furosemide  40 mg Intravenous BID    gabapentin  100 mg Oral TID    vancomycin  1,000 mg Intravenous Q12H    cefTRIAXone (ROCEPHIN) IV  2 g Intravenous Q24H    vitamin B-1  100 mg Oral Daily    spironolactone  100 mg Oral Daily    rifaximin  550 mg Oral BID    pantoprazole  40 mg Oral Daily    nadolol  20 mg Oral Daily    lactulose  20 g Oral TID    sodium chloride flush  10 mL Intravenous 2 times per day     Continuous Infusions:  PRN Meds:. HYDROcodone 5 mg - acetaminophen, sodium chloride flush, magnesium hydroxide, ondansetron, acetaminophen, traMADol **OR** traMADol    PHYSICAL EXAM:  /82   Pulse 76   Temp 97.8 °F (36.6 °C) (Oral)   Resp 16   Ht 5' 10\" (1.778 m)

## 2019-04-02 NOTE — PROGRESS NOTES
Patient complained of pain all day. Started with Ultram which he said did not help at all. He was then switched to norco and once again said that was not helping either and was noted to be yelling out \"just cut my damn legs off, the pain is too much! \"  NP notified and then ordered him percocet 5mg (2 tabs) Q6H prn and said he can have a dose of torodol this evening if not much relief between doses. He did not finish his second dose this shift of lactulose stating it was giving him diarrhea. Patient does have good appetite and eating well. Will continue to monitor.

## 2019-04-02 NOTE — CARE COORDINATION
Wound care orders initiated per Carlos Andrade.    Bedside nurse to implement \"Change dressing\" order tonight for care of BLE.   [facility] nurse will see patient     Electronically signed by Daisy Cerna RN on 4/2/2019 at 4:57 PM

## 2019-04-02 NOTE — PROGRESS NOTES
4 Eyes Skin Assessment     The patient is being assess for  Admission    I agree that 2 RN's have performed a thorough Head to Toe Skin Assessment on the patient. ALL assessment sites listed below have been assessed. Areas assessed by both nurses:   [x]   Head, Face, and Ears   [x]   Shoulders, Back, and Chest  [x]   Arms, Elbows, and Hands   [x]   Coccyx, Sacrum, and IschIum  [x]   Legs, Feet, and Heels        Does the Patient have Skin Breakdown?   No         Jakob Prevention initiated:  No   Wound Care Orders initiated:  No      [facility] nurse consulted for Pressure Injury (Stage 3,4, Unstageable, DTI, NWPT, and Complex wounds), New and Established Ostomies:  No      Nurse 1 eSignature: Electronically signed by Bettina Llanes RN on 4/1/19 at 11:31 PM    **SHARE this note so that the co-signing nurse is able to place an eSignature**    Nurse 2 eSignature: Electronically signed by Luz Marina Melvin RN on 4/2/19 at 2:28 AM

## 2019-04-02 NOTE — PROGRESS NOTES
Pt admitted to [facility] 5 Unit; room 4264. Pt A&O, oriented to room and call light. VS stable at this time. RR even and unlabored. Assessment complete. BLE swelling, redness, blisters noted. Pt states he \"attempted to tx on his own, but turns out soap and water would have been the best option. Fall precautions implemented. Plan of care discussed.    Electronically signed by Farzana Cox RN on 4/1/19 at 11:27 PM

## 2019-04-02 NOTE — CONSULTS
Infectious Diseases Inpatient Consult Note      Reason for Consult:  Bi lateral lower leg cellulitis and Cirrhosis of liver and ascites     Requesting Physician:        Primary Care Physician:  Jovany Dorsey MD    History Obtained From:  Pt and Medical records     CHIEF COMPLAINT:     Chief Complaint   Patient presents with    Leg Pain     Patient states he has psoriasis and for the last month he has had increased pain to bilateral legs with seeping from wounds. Bi lateral lower leg pain and swelling+ with redness on going     HISTORY OF PRESENT ILLNESS:  46 y.o. man with cirrhosis of liver and lower leg swelling on going weeping drainage and pain pt tried hydrogen peroxide to help heal the wound and control pain resulted in more skin injury now has crusting and flaking and redness spreading up the legs and in lot of pain. He also noticed his abdomen more distension and he had paracentesis in the past from cirrhosis of liver. He has leukopenia and thrombocytopenia from cirrhosis and splenomegaly and he has h/o umbilical hernia repair.          Past Medical History:    Past Medical History:   Diagnosis Date    Cirrhosis [facility])        Past Surgical History:    Past Surgical History:   Procedure Laterality Date    HERNIA REPAIR         Current Medications:    Outpatient Medications Marked as Taking for the 4/1/19 encounter [facility] Encounter)   Medication Sig Dispense Refill    lactulose (CHRONULAC) 10 GM/15ML solution Take 20 g by mouth 3 times daily      pantoprazole (PROTONIX) 40 MG tablet Take 40 mg by mouth daily      nadolol (CORGARD) 20 MG tablet Take 20 mg by mouth daily      vitamin B-1 (THIAMINE) 100 MG tablet Take 100 mg by mouth daily      spironolactone (ALDACTONE) 100 MG tablet Take 100 mg by mouth daily      rifaximin (XIFAXAN) 550 MG tablet Take 550 mg by mouth 2 times daily      furosemide (LASIX) 40 MG tablet Take 40 mg by mouth daily         Allergies:  Patient has no known allergies. Immunizations : There is no immunization history on file for this patient. Social History:     Social History     Tobacco Use    Smoking status: Former Smoker    Smokeless tobacco: Never Used   Substance Use Topics    Alcohol use: No    Drug use: No     Social History     Tobacco Use   Smoking Status Former Smoker   Smokeless Tobacco Never Used      Family History :   No HTN, NO dm     REVIEW OF SYSTEMS:    No fever / chills / sweats. No weight loss. No visual change, eye pain, eye discharge. No oral lesion, sore throat, dysphagia. Denies cough / sputum/Sob   Denies chest pain, palpitations/ dizziness  Denies nausea/ vomiting/abdominal pain/diarrhea. Denies dysuria or change in urinary function. Denies joint swelling or pain. No myalgia, arthralgia. No rashes, skin lesions   Denies focal weakness, sensory change or other neurologic symptoms  No lymph node swelling or tenderness.     Bi lateral lower leg redness and blistering lesions and on gong drainage and pain with abdominal distension+     PHYSICAL EXAM:      Vitals:    /82   Pulse 76   Temp 97.8 °F (36.6 °C) (Oral)   Resp 16   Ht 5' 10\" (1.778 m)   Wt 223 lb 12.3 oz (101.5 kg)   SpO2 93%   BMI 32.11 kg/m²     General Appearance: alert,in some+ acute distress, +  pallor, no icterus skin changes from cirrhosis of liver    Skin: warm and dry, no rash or erythema  Head: normocephalic and atraumatic  Eyes: pupils equal, round, and reactive to light, conjunctivae normal  ENT: tympanic membrane, external ear and ear canal normal bilaterally, nose without deformity, nasal mucosa and turbinates normal without polyps  Neck: supple and non-tender without mass, no thyromegaly  no cervical lymphadenopathy  Pulmonary/Chest: clear to auscultation bilaterally- no wheezes, rales or rhonchi, normal air movement, no respiratory distress  Cardiovascular: normal rate, regular rhythm, normal S1 and S2, no murmurs, rubs, clicks, or gallops, no carotid bruits  Abdomen: soft, ++-distended, normal bowel sounds, no masses or organomegaly umbilical hernia repair scar+ ASCITES+   Extremities: no cyanosis, clubbing or ++  edema  Musculoskeletal: normal range of motion, no joint swelling, deformity or tenderness  Neurologic: reflexes normal and symmetric, no cranial nerve deficit  Psych:  Orientation, sensorium, mood normal  Lines: IV  Bi lateral lower leg edema and skin flaking off yellow cursting with on going local warmth and chronic skin changes from edema pigmentation            DATA:    Lab Results   Component Value Date    WBC 3.8 (L) 04/02/2019    HGB 10.9 (L) 04/02/2019    HCT 33.7 (L) 04/02/2019    MCV 87.7 04/02/2019    PLT 94 (L) 04/02/2019     Lab Results   Component Value Date    CREATININE <0.5 (L) 04/02/2019    BUN 7 04/02/2019     (L) 04/02/2019    K 3.7 04/02/2019     04/02/2019    CO2 22 04/02/2019       Hepatic Function Panel:   Lab Results   Component Value Date    ALKPHOS 102 04/01/2019    ALT 49 04/01/2019    AST 82 04/01/2019    PROT 6.7 04/01/2019    PROT 7.3 10/06/2011    BILITOT 1.6 04/01/2019    BILIDIR 3.10 07/27/2011    IBILI 1.9 07/27/2011    LABALBU 2.2 04/01/2019     UA:  Lab Results   Component Value Date    COLORU DK YELLOW 06/08/2015    CLARITYU Clear 06/08/2015    GLUCOSEU Negative 06/08/2015    GLUCOSEU Negative 07/25/2011    BILIRUBINUR Negative 06/08/2015    BILIRUBINUR Moderate 07/25/2011    KETUA Negative 06/08/2015    SPECGRAV 1.012 06/08/2015    BLOODU SMALL 06/08/2015    PHUR 7.0 06/08/2015    PHUR 6.0 06/08/2015    PROTEINU Negative 06/08/2015    UROBILINOGEN 1.0 06/08/2015    NITRU Negative 06/08/2015    LEUKOCYTESUR Negative 06/08/2015    LABMICR YES 06/08/2015    URINETYPE NON SPECIFIED 06/08/2015      Urine Microscopic:   Lab Results   Component Value Date    BACTERIA Rare 07/25/2011    HYALCAST 1 06/08/2015    WBCUA 0 06/08/2015    RBCUA 11 06/08/2015    EPIU 0 06/08/2015         Scheduled Meds:   vitamin B-1  100 mg Oral Daily    spironolactone  100 mg Oral Daily    rifaximin  550 mg Oral BID    pantoprazole  40 mg Oral Daily    nadolol  20 mg Oral Daily    lactulose  20 g Oral TID    furosemide  40 mg Oral Daily    sodium chloride flush  10 mL Intravenous 2 times per day    enoxaparin  40 mg Subcutaneous Daily       Continuous Infusions:      PRN Meds:  sodium chloride flush, magnesium hydroxide, ondansetron, acetaminophen, traMADol **OR** traMADol  LACTIC ACID  1.8     MICRO: cultures reviewed and updated by me          Culture Blood #1 [[id number]] Collected: 04/01/19 1849   Order Status: Sent Specimen: Blood Updated: 04/01/19 1853   Culture Blood #2 [[id number]] Collected: 04/01/19 1849   Order Status: Sent Specimen: Blood Updated: 04/01/19 1853 /2019  4:14 PM - Timmothy Stevens Incoming Lab Results From Soft (Epic Adt)     Component Value Ref Range & Units Status Collected Lab   Ammonia 47  16 - 60 umol/L Final 04/01/2019  3:40 PM [facility]   Testing Performed By     [facility] Name Director Address Valid Date Range   [facility] - Zo Benoit M.D., Ph.D. [address] [place] [id number] 08/30/17 0817-Present   Narrative     ASSAYLast Updated: 2/2/2018  [facility]  Component Name Value Ref Range   HIV Ag/Ab Screen Nonreactive   Comment: HIV-1 p24 Ag and HIV-1/HIV-2 Ab not detected. Nonreactive    Specimen Collected on   SERUM Unknown         Urine Culture  No results for input(s): LABURIN in the last 72 hours. Imaging:   No orders to display     IMPRESSION:    Abdominal ultrasound:    Cirrhotic morphology of the liver with ascites. Cholelithiasis. Abdominal Doppler:    Patent hepatic vasculature with antegrade flow within the portal veins and hepatic arteries. The study was terminated due to the patient's combative state.     Report Verified by: Camacho Buchanan M.D. at 10/30/2018 10:24 AM EDT

## 2019-04-03 ENCOUNTER — APPOINTMENT (OUTPATIENT)
Dept: ULTRASOUND IMAGING | Age: 49
DRG: 603 | End: 2019-04-03
Payer: MEDICARE

## 2019-04-03 LAB
ALBUMIN FLUID: 0.6 G/DL
ALBUMIN SERPL-MCNC: 1.9 G/DL (ref 3.4–5)
ANION GAP SERPL CALCULATED.3IONS-SCNC: 8 MMOL/L (ref 3–16)
APPEARANCE FLUID: NORMAL
BASOPHILS ABSOLUTE: 0.1 K/UL (ref 0–0.2)
BASOPHILS RELATIVE PERCENT: 4.1 %
BUN BLDV-MCNC: 10 MG/DL (ref 7–20)
CALCIUM IONIZED: 1 MMOL/L (ref 1.12–1.32)
CALCIUM SERPL-MCNC: 7 MG/DL (ref 8.3–10.6)
CELL COUNT FLUID TYPE: NORMAL
CHLORIDE BLD-SCNC: 103 MMOL/L (ref 99–110)
CLOT EVALUATION: NORMAL
CO2: 25 MMOL/L (ref 21–32)
COLOR FLUID: YELLOW
CREAT SERPL-MCNC: 0.6 MG/DL (ref 0.9–1.3)
EOSINOPHILS ABSOLUTE: 0.1 K/UL (ref 0–0.6)
EOSINOPHILS RELATIVE PERCENT: 5.1 %
FLUID TYPE: NORMAL
GFR AFRICAN AMERICAN: >60
GFR NON-AFRICAN AMERICAN: >60
GLUCOSE BLD-MCNC: 154 MG/DL (ref 70–99)
HCT VFR BLD CALC: 34.2 % (ref 40.5–52.5)
HEMOGLOBIN: 11.1 G/DL (ref 13.5–17.5)
INR BLD: 1.66 (ref 0.86–1.14)
LD, FLUID: 60 U/L
LYMPHOCYTES ABSOLUTE: 0.3 K/UL (ref 1–5.1)
LYMPHOCYTES RELATIVE PERCENT: 14.3 %
LYMPHOCYTES, BODY FLUID: 53 %
MACROPHAGE FLUID: 4 %
MAGNESIUM: 1.8 MG/DL (ref 1.8–2.4)
MCH RBC QN AUTO: 28.4 PG (ref 26–34)
MCHC RBC AUTO-ENTMCNC: 32.4 G/DL (ref 31–36)
MCV RBC AUTO: 87.6 FL (ref 80–100)
MONOCYTE, FLUID: 36 %
MONOCYTES ABSOLUTE: 0.4 K/UL (ref 0–1.3)
MONOCYTES RELATIVE PERCENT: 16.5 %
NEUTROPHIL, FLUID: 7 %
NEUTROPHILS ABSOLUTE: 1.5 K/UL (ref 1.7–7.7)
NEUTROPHILS RELATIVE PERCENT: 60 %
NUCLEATED CELLS FLUID: 75 /CUMM
NUMBER OF CELLS COUNTED FLUID: 100
PDW BLD-RTO: 19.6 % (ref 12.4–15.4)
PH VENOUS: 7.42 (ref 7.35–7.45)
PHOSPHORUS: 4.1 MG/DL (ref 2.5–4.9)
PLATELET # BLD: 105 K/UL (ref 135–450)
PMV BLD AUTO: 10.5 FL (ref 5–10.5)
POTASSIUM SERPL-SCNC: 3.2 MMOL/L (ref 3.5–5.1)
PROTEIN FLUID: 1.4 G/DL
PROTHROMBIN TIME: 18.9 SEC (ref 9.8–13)
RBC # BLD: 3.9 M/UL (ref 4.2–5.9)
RBC FLUID: 426 /CUMM
SODIUM BLD-SCNC: 136 MMOL/L (ref 136–145)
VOLUME: 700 ML
WBC # BLD: 2.4 K/UL (ref 4–11)

## 2019-04-03 PROCEDURE — 99232 SBSQ HOSP IP/OBS MODERATE 35: CPT | Performed by: INTERNAL MEDICINE

## 2019-04-03 PROCEDURE — 2580000003 HC RX 258: Performed by: INTERNAL MEDICINE

## 2019-04-03 PROCEDURE — 85610 PROTHROMBIN TIME: CPT

## 2019-04-03 PROCEDURE — 80069 RENAL FUNCTION PANEL: CPT

## 2019-04-03 PROCEDURE — 82330 ASSAY OF CALCIUM: CPT

## 2019-04-03 PROCEDURE — 0W9G3ZX DRAINAGE OF PERITONEAL CAVITY, PERCUTANEOUS APPROACH, DIAGNOSTIC: ICD-10-PCS | Performed by: RADIOLOGY

## 2019-04-03 PROCEDURE — 6370000000 HC RX 637 (ALT 250 FOR IP): Performed by: NURSE PRACTITIONER

## 2019-04-03 PROCEDURE — 1200000000 HC SEMI PRIVATE

## 2019-04-03 PROCEDURE — 2709999900 US GUIDED PARACENTESIS

## 2019-04-03 PROCEDURE — 82042 OTHER SOURCE ALBUMIN QUAN EA: CPT

## 2019-04-03 PROCEDURE — 85025 COMPLETE CBC W/AUTO DIFF WBC: CPT

## 2019-04-03 PROCEDURE — 2580000003 HC RX 258: Performed by: NURSE PRACTITIONER

## 2019-04-03 PROCEDURE — 84157 ASSAY OF PROTEIN OTHER: CPT

## 2019-04-03 PROCEDURE — 36415 COLL VENOUS BLD VENIPUNCTURE: CPT

## 2019-04-03 PROCEDURE — 87205 SMEAR GRAM STAIN: CPT

## 2019-04-03 PROCEDURE — 6360000002 HC RX W HCPCS: Performed by: INTERNAL MEDICINE

## 2019-04-03 PROCEDURE — 83615 LACTATE (LD) (LDH) ENZYME: CPT

## 2019-04-03 PROCEDURE — 6360000002 HC RX W HCPCS: Performed by: NURSE PRACTITIONER

## 2019-04-03 PROCEDURE — 6370000000 HC RX 637 (ALT 250 FOR IP): Performed by: HOSPITALIST

## 2019-04-03 PROCEDURE — 87070 CULTURE OTHR SPECIMN AEROBIC: CPT

## 2019-04-03 PROCEDURE — 83735 ASSAY OF MAGNESIUM: CPT

## 2019-04-03 PROCEDURE — 6370000000 HC RX 637 (ALT 250 FOR IP): Performed by: INTERNAL MEDICINE

## 2019-04-03 RX ORDER — POTASSIUM CHLORIDE 20 MEQ/1
40 TABLET, EXTENDED RELEASE ORAL ONCE
Status: COMPLETED | OUTPATIENT
Start: 2019-04-03 | End: 2019-04-03

## 2019-04-03 RX ORDER — FUROSEMIDE 40 MG/1
40 TABLET ORAL DAILY
Status: DISCONTINUED | OUTPATIENT
Start: 2019-04-03 | End: 2019-04-05 | Stop reason: HOSPADM

## 2019-04-03 RX ORDER — DIPHENHYDRAMINE HCL 25 MG
25 TABLET ORAL EVERY 6 HOURS PRN
Status: DISCONTINUED | OUTPATIENT
Start: 2019-04-03 | End: 2019-04-05 | Stop reason: HOSPADM

## 2019-04-03 RX ADMIN — CALCIUM GLUCONATE 1 G: 98 INJECTION, SOLUTION INTRAVENOUS at 15:31

## 2019-04-03 RX ADMIN — DIPHENHYDRAMINE HCL 25 MG: 25 TABLET ORAL at 04:20

## 2019-04-03 RX ADMIN — LACTULOSE 20 G: 20 SOLUTION ORAL at 08:13

## 2019-04-03 RX ADMIN — GABAPENTIN 100 MG: 100 CAPSULE ORAL at 08:12

## 2019-04-03 RX ADMIN — VANCOMYCIN HYDROCHLORIDE 1000 MG: 1 INJECTION, POWDER, LYOPHILIZED, FOR SOLUTION INTRAVENOUS at 17:26

## 2019-04-03 RX ADMIN — LACTULOSE 20 G: 20 SOLUTION ORAL at 14:12

## 2019-04-03 RX ADMIN — GABAPENTIN 100 MG: 100 CAPSULE ORAL at 14:12

## 2019-04-03 RX ADMIN — FUROSEMIDE 40 MG: 10 INJECTION, SOLUTION INTRAMUSCULAR; INTRAVENOUS at 08:14

## 2019-04-03 RX ADMIN — OXYCODONE AND ACETAMINOPHEN 2 TABLET: 5; 325 TABLET ORAL at 19:57

## 2019-04-03 RX ADMIN — POTASSIUM CHLORIDE 40 MEQ: 20 TABLET, EXTENDED RELEASE ORAL at 11:48

## 2019-04-03 RX ADMIN — SPIRONOLACTONE 100 MG: 25 TABLET ORAL at 08:12

## 2019-04-03 RX ADMIN — OXYCODONE AND ACETAMINOPHEN 2 TABLET: 5; 325 TABLET ORAL at 08:12

## 2019-04-03 RX ADMIN — RIFAXIMIN 550 MG: 550 TABLET ORAL at 20:30

## 2019-04-03 RX ADMIN — Medication 10 ML: at 08:14

## 2019-04-03 RX ADMIN — LACTULOSE 20 G: 20 SOLUTION ORAL at 20:26

## 2019-04-03 RX ADMIN — GABAPENTIN 100 MG: 100 CAPSULE ORAL at 20:30

## 2019-04-03 RX ADMIN — CEFTRIAXONE 2 G: 2 INJECTION, POWDER, FOR SOLUTION INTRAMUSCULAR; INTRAVENOUS at 16:53

## 2019-04-03 RX ADMIN — PANTOPRAZOLE SODIUM 40 MG: 40 TABLET, DELAYED RELEASE ORAL at 08:12

## 2019-04-03 RX ADMIN — RIFAXIMIN 550 MG: 550 TABLET ORAL at 08:12

## 2019-04-03 RX ADMIN — Medication 100 MG: at 08:13

## 2019-04-03 RX ADMIN — NADOLOL 20 MG: 20 TABLET ORAL at 09:10

## 2019-04-03 RX ADMIN — OXYCODONE AND ACETAMINOPHEN 2 TABLET: 5; 325 TABLET ORAL at 14:16

## 2019-04-03 RX ADMIN — Medication 10 ML: at 23:06

## 2019-04-03 RX ADMIN — VANCOMYCIN HYDROCHLORIDE 1000 MG: 1 INJECTION, POWDER, LYOPHILIZED, FOR SOLUTION INTRAVENOUS at 03:47

## 2019-04-03 ASSESSMENT — PAIN SCALES - GENERAL
PAINLEVEL_OUTOF10: 8
PAINLEVEL_OUTOF10: 6
PAINLEVEL_OUTOF10: 8
PAINLEVEL_OUTOF10: 8
PAINLEVEL_OUTOF10: 0
PAINLEVEL_OUTOF10: 5
PAINLEVEL_OUTOF10: 0

## 2019-04-03 ASSESSMENT — PAIN DESCRIPTION - PAIN TYPE
TYPE: ACUTE PAIN

## 2019-04-03 ASSESSMENT — PAIN DESCRIPTION - PROGRESSION: CLINICAL_PROGRESSION: GRADUALLY WORSENING

## 2019-04-03 ASSESSMENT — PAIN DESCRIPTION - ORIENTATION
ORIENTATION: RIGHT;LEFT
ORIENTATION: RIGHT;LEFT
ORIENTATION: LEFT;RIGHT

## 2019-04-03 ASSESSMENT — PAIN DESCRIPTION - LOCATION
LOCATION: LEG

## 2019-04-03 ASSESSMENT — PAIN DESCRIPTION - FREQUENCY: FREQUENCY: CONTINUOUS

## 2019-04-03 ASSESSMENT — PAIN DESCRIPTION - ONSET: ONSET: ON-GOING

## 2019-04-03 ASSESSMENT — PAIN DESCRIPTION - DESCRIPTORS
DESCRIPTORS: ACHING
DESCRIPTORS: BURNING
DESCRIPTORS: BURNING

## 2019-04-03 ASSESSMENT — PAIN - FUNCTIONAL ASSESSMENT: PAIN_FUNCTIONAL_ASSESSMENT: PREVENTS OR INTERFERES SOME ACTIVE ACTIVITIES AND ADLS

## 2019-04-03 NOTE — PROGRESS NOTES
BLE cleansed and dressing applied per wound care orders. Daily changes. Complete linen changed. Pt reports \"being able to walk better after ace wrap application\". BLE pain/burning under control at this time.    Electronically signed by Rekha Tejeda RN on 4/2/19 at 10:20 PM

## 2019-04-03 NOTE — PLAN OF CARE
Problem: Infection:  Goal: Will remain free from infection  Description  Will remain free from infection  Outcome: Ongoing     Problem: Safety:  Goal: Free from accidental physical injury  Description  Free from accidental physical injury  Outcome: Ongoing  Goal: Free from intentional harm  Description  Free from intentional harm  Outcome: Ongoing     Problem: Daily Care:  Goal: Daily care needs are met  Description  Daily care needs are met  Outcome: Ongoing     Problem: Pain:  Goal: Patient's pain/discomfort is manageable  Description  Patient's pain/discomfort is manageable  Outcome: Ongoing     Problem: Skin Integrity:  Goal: Skin integrity will stabilize  Description  Skin integrity will stabilize  Outcome: Ongoing     Problem: Discharge Planning:  Goal: Patients continuum of care needs are met  Description  Patients continuum of care needs are met  Outcome: Ongoing

## 2019-04-03 NOTE — PROGRESS NOTES
Pt c/o BLE itching. Dr. Marvin Nelson notified and order requested for prn Benadryl. Order placed. To administer and monitor.   Electronically signed by Joshua Wynn RN on 4/3/19 at 4:17 AM

## 2019-04-03 NOTE — PROGRESS NOTES
Hospital Medicine Progress Note      Admit Date: 4/1/2019         Overnight Events: continues to have pain in BLE    CC: F/U for BLE pain and swelling    HPI:   This is a 57-year-old male with a history of liver cirrhosis and alcohol use who presented to the ED with complaints of worsening pain and redness in his bilateral lower extremities. He states that his symptoms have been present for approximately one and half months now. He said no fevers or chills. He states that he's been \"out of my meds for a few months now. \" He also states that he \"needs a paracentesis. \"     Interval History/Subjective:   Burning sensation has subsided and is feeling less pain today. Denies any fevers, chills, chest pain, shortness breath, nausea, vomiting or abdominal pain. No dysuria. Review of Systems:     Comprehensive ROS negative except as mentioned above. Past Medical History:        Diagnosis Date    Cirrhosis [facility])        Past Surgical History:        Procedure Laterality Date    HERNIA REPAIR         Allergies:  Patient has no known allergies. Past medical and surgical history reviewed. Any changes have been noted.      Scheduled and prn Medications:    Scheduled Meds:   furosemide  40 mg Oral Daily    gabapentin  100 mg Oral TID    vancomycin  1,000 mg Intravenous Q12H    cefTRIAXone (ROCEPHIN) IV  2 g Intravenous Q24H    vitamin B-1  100 mg Oral Daily    spironolactone  100 mg Oral Daily    rifaximin  550 mg Oral BID    pantoprazole  40 mg Oral Daily    nadolol  20 mg Oral Daily    lactulose  20 g Oral TID    sodium chloride flush  10 mL Intravenous 2 times per day     Continuous Infusions:  PRN Meds:.diphenhydrAMINE, oxyCODONE-acetaminophen, sodium chloride flush, magnesium hydroxide, ondansetron, acetaminophen    PHYSICAL EXAM:  BP (!) 92/43   Pulse 60   Temp 97.7 °F (36.5 °C) (Oral)   Resp 18   Ht 5' 10\" (1.778 m)   Wt 207 lb 14.3 oz (94.3 kg)   SpO2 96%   BMI 29.83 kg/m² Intake/Output Summary (Last 24 hours) at 4/3/2019 1421  Last data filed at 4/3/2019 1417  Gross per 24 hour   Intake 600 ml   Output 5900 ml   Net -5300 ml       General: Alert and oriented. Resting in bed in no apparent distress. HEENT: Normocephalic. Atraumatic. Pupils equal and reactive. EOM intact. Oral mucosa pink/moist/intact. Neck: Supple. Symmetrical. Trachea midline. Lungs: Clear to auscultation bilaterally. Respirations even and unlabored. Chest: Exam unremarkable. Cardiac: S1/S2 noted. Regular Rhythm and rate. Abdomen/GI: Soft. Non-tender.BS+. Distended but somewhat improved  Extremities: PP+. Atraumatic. No redness/cyanosis noted. Brisk cap refill. Bilateral lower extremity pitting edema noted left greater than right, scales noted with clear yellow drainage, improved  Skin: Dry and intact. No lesions noted. Neuro: Grossly intact. No focal deficits noted. LABS:    Lab Results   Component Value Date    WBC 2.4 (L) 04/03/2019    HGB 11.1 (L) 04/03/2019    HCT 34.2 (L) 04/03/2019    MCV 87.6 04/03/2019     (L) 04/03/2019    LYMPHOPCT 14.3 04/03/2019    RBC 3.90 (L) 04/03/2019    MCH 28.4 04/03/2019    MCHC 32.4 04/03/2019    RDW 19.6 (H) 04/03/2019       Lab Results   Component Value Date    CREATININE 0.6 (L) 04/03/2019    BUN 10 04/03/2019     04/03/2019    K 3.2 (L) 04/03/2019     04/03/2019    CO2 25 04/03/2019       Lab Results   Component Value Date    MG 1.80 04/03/2019       Lab Results   Component Value Date    ALT 49 (H) 04/01/2019    AST 82 (H) 04/01/2019    ALKPHOS 102 04/01/2019    BILITOT 1.6 (H) 04/01/2019        No flowsheet data found. Imaging:  US GUIDED PARACENTESIS   Final Result   Successful ultrasound guided paracentesis.          VL Extremity Venous Bilateral   Final Result          Assessment & Plan:      Bilateral lower extremity redness with swelling- Likely Venous stasis dermatitis with superimposed cellulitis   - Wound care was consulted- asya applied, will refer at discharge  - Elevate lower extremities  - Blood cultures - NGTD  - on ceftriaxone and Vanc    Decompensated liver cirrhosis with ascites  - Patient has been off his medications for several months now  - Aldactone restarted  - Lasix added BID- decreased to daily  - Abdominal ultrasound for paracentesis, cell studies ordered- does not appear to be SBP, SAG showing 1.3 and likely related to portal HTN    Pancytopenia  - Likely secondary to liver cirrhosis  - Monitor closely for possible hematology consult if not improved  - Plt, Hgb improved, still with Leukopenia, could be related to infection    Chronic alcohol abuse  - supportive treatment    Tobacco abuse    Chronic hepatitis C      Body mass index is 29.83 kg/m². The patient and / or the family were informed of the results of any tests, a time was given to answer questions, a plan was proposed and they agreed with plan.     DVT prophylaxis: [] Lovenox  [] SQ Heparin  [x] SCDs because of  [] warfarin/oral direct thrombin inhibitor [] Encourage ambulation    GI prophylaxis: [] PPI/[id number]  [] not indicated    Probiotic if on abx: [] Yes [] No [] Not Indicated    Diet: DIET LOW SODIUM 2 GM; 1500 ml    Consults:  IP CONSULT TO INFECTIOUS DISEASES    Disposition:  [] Home  [] Home with home health [] Rehab [] Psych [] SNF  [] LTAC  [] Long term nursing home or group home [] Transfer to ICU  [] Transfer to PCU [] Other:    Code Status: Full Code    ELOS: likely tomorrow      KVNG Pride CNP  04/03/19

## 2019-04-03 NOTE — PROGRESS NOTES
Infectious Disease Follow up Notes  Admit Date: 4/1/2019  Hospital Day: 3    Antibiotics : IV Vancomycin  IV Ceftriaxone      CHIEF COMPLAINT:     Bi lateral lower leg cellulitis  Open wounds  Lower leg edema  Cirrhosis of liver  Ascites    Subjective interval History :  46 y.o. man with cirrhosis of liver and lower leg swelling on going weeping drainage and pain pt tried hydrogen peroxide to help heal the wound and control pain resulted in more skin injury now has crusting and flaking and redness spreading up the legs and in lot of pain. He also noticed his abdomen more distension and he had paracentesis in the past from cirrhosis of liver. He has leukopenia and thrombocytopenia from cirrhosis and splenomegaly and he has h/o umbilical hernia repair. Lower leg pain a bit better less weeping and pain controlled, venous duplex negative and s/p IR drainage of ascites no SBP by labs and fluid analysis tolerating IV abx ok              Past Medical History:    Past Medical History:   Diagnosis Date    Cirrhosis ([facility] [address]        Past Surgical History:    Past Surgical History:   Procedure Laterality Date    HERNIA REPAIR         Current Medications:    Outpatient Medications Marked as Taking for the 4/1/19 encounter [facility] Encounter)   Medication Sig Dispense Refill    lactulose (CHRONULAC) 10 GM/15ML solution Take 20 g by mouth 3 times daily      pantoprazole (PROTONIX) 40 MG tablet Take 40 mg by mouth daily      nadolol (CORGARD) 20 MG tablet Take 20 mg by mouth daily      vitamin B-1 (THIAMINE) 100 MG tablet Take 100 mg by mouth daily      spironolactone (ALDACTONE) 100 MG tablet Take 100 mg by mouth daily      rifaximin (XIFAXAN) 550 MG tablet Take 550 mg by mouth 2 times daily      furosemide (LASIX) 40 MG tablet Take 40 mg by mouth daily         Allergies:  Patient has no known allergies. Immunizations :    There is no immunization history on file for this patient. Social History:    Social History     Tobacco Use    Smoking status: Former Smoker    Smokeless tobacco: Never Used   Substance Use Topics    Alcohol use: No    Drug use: No     Social History     Tobacco Use   Smoking Status Former Smoker   Smokeless Tobacco Never Used      Family history : no DVT no COPD        REVIEW OF SYSTEMS:    No fever / chills / sweats. No weight loss. No visual change, eye pain, eye discharge. No oral lesion, sore throat, dysphagia. Denies cough / sputum/Sob   Denies chest pain, palpitations/ dizziness  Denies nausea/ vomiting/abdominal pain/diarrhea. Denies dysuria or change in urinary function. Denies joint swelling or pain. No myalgia, arthralgia. No rashes, skin lesions   Denies focal weakness, sensory change or other neurologic symptoms  No lymph node swelling or tenderness.     LOWER Leg cellulitis and open ulcers+ drainage_ leg pain+  Ascites+    PHYSICAL EXAM:      Vitals:    BP (!) 92/43   Pulse 60   Temp 97.7 °F (36.5 °C) (Oral)   Resp 18   Ht 5' 10\" (1.778 m)   Wt 207 lb 14.3 oz (94.3 kg)   SpO2 96%   BMI 29.83 kg/m²     General Appearance: alert,in some+ acute distress, +  pallor, no icterus skin changes from cirrhosis of liver    Skin: warm and dry, no rash or erythema  Head: normocephalic and atraumatic  Eyes: pupils equal, round, and reactive to light, conjunctivae normal  ENT: tympanic membrane, external ear and ear canal normal bilaterally, nose without deformity, nasal mucosa and turbinates normal without polyps  Neck: supple and non-tender without mass, no thyromegaly  no cervical lymphadenopathy  Pulmonary/Chest: clear to auscultation bilaterally- no wheezes, rales or rhonchi, normal air movement, no respiratory distress  Cardiovascular: normal rate, regular rhythm, normal S1 and S2, no murmurs, rubs, clicks, or gallops, no carotid bruits  Abdomen: soft, ++-distended, normal bowel sounds, no masses or organomegaly umbilical hernia repair scar+ s/p drainage of the  ASCITES+   Extremities: no cyanosis, clubbing or ++  edema  Musculoskeletal: normal range of motion, no joint swelling, deformity or tenderness  Neurologic: reflexes normal and symmetric, no cranial nerve deficit  Psych:  Orientation, sensorium, mood normal  Lines: IV  Bi lateral lower leg edema and skin flaking off yellow cursting with on going local warmth cellulitis resolving and improving slowly  and chronic skin changes from edema pigmentation                       Data Review:    Lab Results   Component Value Date    WBC 2.4 (L) 04/03/2019    HGB 11.1 (L) 04/03/2019    HCT 34.2 (L) 04/03/2019    MCV 87.6 04/03/2019     (L) 04/03/2019     Lab Results   Component Value Date    CREATININE 0.6 (L) 04/03/2019    BUN 10 04/03/2019     04/03/2019    K 3.2 (L) 04/03/2019     04/03/2019    CO2 25 04/03/2019       Hepatic Function Panel:   Lab Results   Component Value Date    ALKPHOS 102 04/01/2019    ALT 49 04/01/2019    AST 82 04/01/2019    PROT 6.7 04/01/2019    PROT 7.3 10/06/2011    BILITOT 1.6 04/01/2019    BILIDIR 3.10 07/27/2011    IBILI 1.9 07/27/2011    LABALBU 1.9 04/03/2019       UA:  Lab Results   Component Value Date    COLORU DK YELLOW 06/08/2015    CLARITYU Clear 06/08/2015    GLUCOSEU Negative 06/08/2015    GLUCOSEU Negative 07/25/2011    BILIRUBINUR Negative 06/08/2015    BILIRUBINUR Moderate 07/25/2011    KETUA Negative 06/08/2015    SPECGRAV 1.012 06/08/2015    BLOODU SMALL 06/08/2015    PHUR 7.0 06/08/2015    PHUR 6.0 06/08/2015    PROTEINU Negative 06/08/2015    UROBILINOGEN 1.0 06/08/2015    NITRU Negative 06/08/2015    LEUKOCYTESUR Negative 06/08/2015    LABMICR YES 06/08/2015    URINETYPE NON SPECIFIED 06/08/2015      Urine Microscopic:   Lab Results   Component Value Date    BACTERIA Rare 07/25/2011    HYALCAST 1 06/08/2015    WBCUA 0 06/08/2015    RBCUA 11 06/08/2015    EPIU 0 06/08/2015     Alb  1.9     Creat Fluid Latest Units: /cumm 75   Protein, Fluid Latest Ref Range: Not Established g/dL 1.4   Albumin, Fluid Latest Ref Range: Not Established g/dL 0.6   Clot Eval. Unknown see below   Number of Cells Counted Fluid Unknown 100       IMAGING:    US GUIDED PARACENTESIS   Final Result   Successful ultrasound guided paracentesis. VL Extremity Venous Bilateral   Final Result        TECHNIQUE:  Informed consent was obtained after a detailed explanation of the procedure  including risks, benefits, and alternatives.  Universal protocol was  followed. Atif Sandra left abdomen was prepped and draped in sterile fashion and  local anesthesia was achieved with lidocaine.  A 5 [place] [employer] needle sheath was  advanced under ultrasound guidance into ascites and paracentesis was  performed.  The patient tolerated the procedure well. FINDINGS:  A total of 2.7 L of yellow fluid was removed.      Impression:       Successful ultrasound guided paracentesis. Impressions  Right Impression  No evidence of deep vein or superficial vein thrombosis involving the right  lower extremity. Calf and ankle edema noted. Calf veins were not well visualized on the right. Left Impression  No evidence of deep vein or superficial vein thrombosis involving the left  lower extremity. Calf and ankle edema noted. Calf veins were not well visualized on the left. Conclusions      Summary      Normal venous duplex examination of the legs bilaterally with normal venous   Doppler signals noted throughout. No evidence of thrombophlebitis is noted   bilaterally in the deep and superficial veins of the legs. Small calf   thrombi cannot be excluded because of the severe edema involving the lower   extremities.  Calf veins are not well visualized      Signature      ------------------------------------------------------------------   Electronically signed by Chata Romero MD   (Interpreting physician) on 04/02/2019 at 03:36 your patient's care and please call me with any questions or concerns.     Rodri Avelar MD  Infectious Disease  [facility] Physician  Phone: [phone]   Fax : [phone]

## 2019-04-04 LAB
ALBUMIN SERPL-MCNC: 2.3 G/DL (ref 3.4–5)
ANION GAP SERPL CALCULATED.3IONS-SCNC: 7 MMOL/L (ref 3–16)
BASOPHILS ABSOLUTE: 0 K/UL (ref 0–0.2)
BASOPHILS RELATIVE PERCENT: 1.5 %
BUN BLDV-MCNC: 7 MG/DL (ref 7–20)
CALCIUM SERPL-MCNC: 7.9 MG/DL (ref 8.3–10.6)
CHLORIDE BLD-SCNC: 103 MMOL/L (ref 99–110)
CO2: 26 MMOL/L (ref 21–32)
CREAT SERPL-MCNC: 0.6 MG/DL (ref 0.9–1.3)
EOSINOPHILS ABSOLUTE: 0.1 K/UL (ref 0–0.6)
EOSINOPHILS RELATIVE PERCENT: 2.5 %
GFR AFRICAN AMERICAN: >60
GFR NON-AFRICAN AMERICAN: >60
GLUCOSE BLD-MCNC: 122 MG/DL (ref 70–99)
HCT VFR BLD CALC: 37.3 % (ref 40.5–52.5)
HEMOGLOBIN: 12 G/DL (ref 13.5–17.5)
LYMPHOCYTES ABSOLUTE: 0.4 K/UL (ref 1–5.1)
LYMPHOCYTES RELATIVE PERCENT: 11 %
MAGNESIUM: 2.1 MG/DL (ref 1.8–2.4)
MCH RBC QN AUTO: 27.8 PG (ref 26–34)
MCHC RBC AUTO-ENTMCNC: 32.1 G/DL (ref 31–36)
MCV RBC AUTO: 86.5 FL (ref 80–100)
MONOCYTES ABSOLUTE: 0.4 K/UL (ref 0–1.3)
MONOCYTES RELATIVE PERCENT: 12.4 %
NEUTROPHILS ABSOLUTE: 2.4 K/UL (ref 1.7–7.7)
NEUTROPHILS RELATIVE PERCENT: 72.6 %
PDW BLD-RTO: 19.2 % (ref 12.4–15.4)
PHOSPHORUS: 3.2 MG/DL (ref 2.5–4.9)
PLATELET # BLD: 138 K/UL (ref 135–450)
PMV BLD AUTO: 10.7 FL (ref 5–10.5)
POTASSIUM SERPL-SCNC: 4.2 MMOL/L (ref 3.5–5.1)
RBC # BLD: 4.31 M/UL (ref 4.2–5.9)
SODIUM BLD-SCNC: 136 MMOL/L (ref 136–145)
WBC # BLD: 3.3 K/UL (ref 4–11)

## 2019-04-04 PROCEDURE — 85025 COMPLETE CBC W/AUTO DIFF WBC: CPT

## 2019-04-04 PROCEDURE — 6360000002 HC RX W HCPCS: Performed by: INTERNAL MEDICINE

## 2019-04-04 PROCEDURE — 80069 RENAL FUNCTION PANEL: CPT

## 2019-04-04 PROCEDURE — 2580000003 HC RX 258: Performed by: INTERNAL MEDICINE

## 2019-04-04 PROCEDURE — 99232 SBSQ HOSP IP/OBS MODERATE 35: CPT | Performed by: INTERNAL MEDICINE

## 2019-04-04 PROCEDURE — 36415 COLL VENOUS BLD VENIPUNCTURE: CPT

## 2019-04-04 PROCEDURE — 6370000000 HC RX 637 (ALT 250 FOR IP): Performed by: NURSE PRACTITIONER

## 2019-04-04 PROCEDURE — 83735 ASSAY OF MAGNESIUM: CPT

## 2019-04-04 PROCEDURE — 94760 N-INVAS EAR/PLS OXIMETRY 1: CPT

## 2019-04-04 PROCEDURE — 1200000000 HC SEMI PRIVATE

## 2019-04-04 PROCEDURE — 6370000000 HC RX 637 (ALT 250 FOR IP): Performed by: HOSPITALIST

## 2019-04-04 PROCEDURE — 6370000000 HC RX 637 (ALT 250 FOR IP): Performed by: INTERNAL MEDICINE

## 2019-04-04 RX ORDER — LACTULOSE 10 G/15ML
20 SOLUTION ORAL 3 TIMES DAILY
Qty: 2700 ML | Refills: 1 | Status: SHIPPED | OUTPATIENT
Start: 2019-04-04 | End: 2019-05-04

## 2019-04-04 RX ORDER — THIAMINE MONONITRATE (VIT B1) 100 MG
100 TABLET ORAL DAILY
Qty: 30 TABLET | Refills: 1 | Status: SHIPPED | OUTPATIENT
Start: 2019-04-04

## 2019-04-04 RX ORDER — GABAPENTIN 100 MG/1
100 CAPSULE ORAL 3 TIMES DAILY
Qty: 90 CAPSULE | Refills: 0 | Status: SHIPPED | OUTPATIENT
Start: 2019-04-04 | End: 2019-05-04

## 2019-04-04 RX ORDER — NADOLOL 20 MG/1
20 TABLET ORAL DAILY
Qty: 30 TABLET | Refills: 1 | Status: SHIPPED | OUTPATIENT
Start: 2019-04-04

## 2019-04-04 RX ORDER — AMOXICILLIN AND CLAVULANATE POTASSIUM 875; 125 MG/1; MG/1
1 TABLET, FILM COATED ORAL 2 TIMES DAILY
Qty: 10 TABLET | Refills: 0 | Status: SHIPPED | OUTPATIENT
Start: 2019-04-04 | End: 2019-04-09

## 2019-04-04 RX ORDER — SPIRONOLACTONE 100 MG/1
100 TABLET, FILM COATED ORAL DAILY
Qty: 30 TABLET | Refills: 1 | Status: SHIPPED | OUTPATIENT
Start: 2019-04-04

## 2019-04-04 RX ORDER — FUROSEMIDE 40 MG/1
40 TABLET ORAL DAILY
Qty: 60 TABLET | Refills: 1 | Status: SHIPPED | OUTPATIENT
Start: 2019-04-04

## 2019-04-04 RX ORDER — PANTOPRAZOLE SODIUM 40 MG/1
40 TABLET, DELAYED RELEASE ORAL DAILY
Qty: 30 TABLET | Refills: 1 | Status: SHIPPED | OUTPATIENT
Start: 2019-04-04

## 2019-04-04 RX ADMIN — DIPHENHYDRAMINE HCL 25 MG: 25 TABLET ORAL at 21:24

## 2019-04-04 RX ADMIN — VANCOMYCIN HYDROCHLORIDE 1000 MG: 1 INJECTION, POWDER, LYOPHILIZED, FOR SOLUTION INTRAVENOUS at 05:45

## 2019-04-04 RX ADMIN — GABAPENTIN 100 MG: 100 CAPSULE ORAL at 08:06

## 2019-04-04 RX ADMIN — LACTULOSE 20 G: 20 SOLUTION ORAL at 21:23

## 2019-04-04 RX ADMIN — FUROSEMIDE 40 MG: 40 TABLET ORAL at 08:06

## 2019-04-04 RX ADMIN — OXYCODONE AND ACETAMINOPHEN 2 TABLET: 5; 325 TABLET ORAL at 13:55

## 2019-04-04 RX ADMIN — Medication 100 MG: at 08:06

## 2019-04-04 RX ADMIN — GABAPENTIN 100 MG: 100 CAPSULE ORAL at 21:24

## 2019-04-04 RX ADMIN — CEFTRIAXONE 2 G: 2 INJECTION, POWDER, FOR SOLUTION INTRAMUSCULAR; INTRAVENOUS at 13:50

## 2019-04-04 RX ADMIN — OXYCODONE AND ACETAMINOPHEN 2 TABLET: 5; 325 TABLET ORAL at 21:24

## 2019-04-04 RX ADMIN — Medication 10 ML: at 21:25

## 2019-04-04 RX ADMIN — GABAPENTIN 100 MG: 100 CAPSULE ORAL at 13:50

## 2019-04-04 RX ADMIN — RIFAXIMIN 550 MG: 550 TABLET ORAL at 21:24

## 2019-04-04 RX ADMIN — Medication 10 ML: at 08:09

## 2019-04-04 RX ADMIN — RIFAXIMIN 550 MG: 550 TABLET ORAL at 08:06

## 2019-04-04 RX ADMIN — NADOLOL 20 MG: 20 TABLET ORAL at 08:07

## 2019-04-04 RX ADMIN — SPIRONOLACTONE 100 MG: 25 TABLET ORAL at 08:06

## 2019-04-04 RX ADMIN — LACTULOSE 20 G: 20 SOLUTION ORAL at 13:50

## 2019-04-04 RX ADMIN — PANTOPRAZOLE SODIUM 40 MG: 40 TABLET, DELAYED RELEASE ORAL at 08:06

## 2019-04-04 RX ADMIN — OXYCODONE AND ACETAMINOPHEN 2 TABLET: 5; 325 TABLET ORAL at 08:06

## 2019-04-04 RX ADMIN — LACTULOSE 20 G: 20 SOLUTION ORAL at 08:06

## 2019-04-04 RX ADMIN — VANCOMYCIN HYDROCHLORIDE 1000 MG: 1 INJECTION, POWDER, LYOPHILIZED, FOR SOLUTION INTRAVENOUS at 15:46

## 2019-04-04 ASSESSMENT — PAIN DESCRIPTION - DESCRIPTORS
DESCRIPTORS: BURNING
DESCRIPTORS: ACHING

## 2019-04-04 ASSESSMENT — PAIN DESCRIPTION - ORIENTATION
ORIENTATION: LEFT
ORIENTATION: RIGHT;LEFT

## 2019-04-04 ASSESSMENT — PAIN - FUNCTIONAL ASSESSMENT: PAIN_FUNCTIONAL_ASSESSMENT: PREVENTS OR INTERFERES SOME ACTIVE ACTIVITIES AND ADLS

## 2019-04-04 ASSESSMENT — PAIN DESCRIPTION - PAIN TYPE
TYPE: ACUTE PAIN
TYPE: ACUTE PAIN

## 2019-04-04 ASSESSMENT — PAIN DESCRIPTION - LOCATION
LOCATION: LEG
LOCATION: LEG

## 2019-04-04 ASSESSMENT — PAIN DESCRIPTION - PROGRESSION
CLINICAL_PROGRESSION: GRADUALLY WORSENING
CLINICAL_PROGRESSION: GRADUALLY WORSENING

## 2019-04-04 ASSESSMENT — PAIN SCALES - GENERAL
PAINLEVEL_OUTOF10: 4
PAINLEVEL_OUTOF10: 7
PAINLEVEL_OUTOF10: 8
PAINLEVEL_OUTOF10: 0
PAINLEVEL_OUTOF10: 4
PAINLEVEL_OUTOF10: 8

## 2019-04-04 ASSESSMENT — PAIN DESCRIPTION - ONSET: ONSET: ON-GOING

## 2019-04-04 NOTE — CARE COORDINATION
[facility] Nurse  Consult Note       NAME:  Radha Sanon  MEDICAL RECORD NUMBER:  [id number]  AGE: 46 y.o. GENDER: male  : 1970  TODAY'S DATE:  2019    Subjective   Reason for WOCN Evaluation and Assessment: to evaluate and treat \"BLE cellulitis with open wounds\"      Valeriano Lagunas is a 46 y.o. male referred by:   [] Physician  [x] Nursing  [] Other:     Wound Identification:  Wound Type: venous  Contributing Factors: edema, venous stasis and non-adherence    Wound History: admitted with leg pain and swelling from home. Pt asked if he's ever been told to wear compression stocking and the patient stated, \"Possibly! \". Bilateral lower extremities assessed. No open wounds noted. Left leg swelling, warmth, and erythema noted. No drainage noted while cleansing BLE. See lower leg photos below. Pt encouraged to wear compression stockings following discharge. Patient Goal of Care:  [x] Wound Healing  [] Odor Control  [] Palliative Care  [] Pain Control   [] Other:         PAST MEDICAL HISTORY        Diagnosis Date    Cirrhosis ([facility] [address]        PAST SURGICAL HISTORY    Past Surgical History:   Procedure Laterality Date    HERNIA REPAIR         FAMILY HISTORY    History reviewed. No pertinent family history. SOCIAL HISTORY    Social History     Tobacco Use    Smoking status: Former Smoker    Smokeless tobacco: Never Used   Substance Use Topics    Alcohol use: No    Drug use: No       ALLERGIES    No Known Allergies    MEDICATIONS    No current facility-administered medications on file prior to encounter. No current outpatient medications on file prior to encounter.        Objective    BP (!) 97/59   Pulse 57   Temp 98.4 °F (36.9 °C) (Oral)   Resp 18   Ht 5' 10\" (1.778 m)   Wt 203 lb 4.2 oz (92.2 kg)   SpO2 94%   BMI 29.17 kg/m²     LABS:  WBC:    Lab Results   Component Value Date    WBC 3.3 2019     H/H:    Lab Results   Component Value Date    HGB 12.0 04/04/2019    HCT 37.3 04/04/2019     PTT:    Lab Results   Component Value Date    APTT 33.2 07/25/2011   [APTT}  PT/INR:    Lab Results   Component Value Date    PROTIME 18.9 04/03/2019    INR 1.66 04/03/2019     HgBA1c:  No results found for: LABA1C    Assessment   Jakob Risk Score: Jakob Scale Score: 21    Patient Active Problem List   Diagnosis Code    Bilateral lower leg cellulitis L03.116, L03.115    Liver cirrhosis (HCC) K74.60       Measurements:        Right lower anterior leg dry flaky skin   No open wounds or drainage noted     Right lower anterior lateral leg with dry flaky skin   No open wounds or drainage noted     Left lower anterior leg dry flaky skin. No open wounds noted. Leg warm to touch with edema and slight erythema. No drainage noted        Left lower anterior-lateral view of leg dry flaky skin. No open wounds noted. Leg warm to touch with edema and slight erythema. No drainage noted     Response to treatment:  Well tolerated by patient. Pain Assessment:  Severity:  0 / 10  Quality of pain: N/A  Wound Pain Timing/Severity: none  Premedicated: No    Plan   Plan of Care:    1. Implement Wound Care orders. See wound care orders for dressing change instructions   Bilateral lower extremities: Cleanse with Medline Bath Wipes (Purple package). Apply Moisturizing Lotion (purple label) BLE. Secure with dry Kerlix Roll and Ace Wrap from Toes to Knees. Change dressing DAILY or as needed for soiling  2. Re-consult if further assistance needed. 3. YOSEPH completed   4. Patient will need to be fitted for compression stocking prescribed by PCP or fitted at Drug Store   5.  Pt educated about leg care and proper hand hygiene     Specialty Bed Required : No   [] Low Air Loss   [] Pressure Redistribution  [] Fluid Immersion  [] Bariatric  [] Total Pressure Relief  [] Other:     Current Diet: DIET LOW SODIUM 2 GM; 1500 ml  Dietician consult:  No    Discharge Plan:  Placement for patient upon discharge: home with support    Patient appropriate for Outpatient [address]: No    Referrals:  []   [] [facility]  [] Supplies  [] Other    Patient/Caregiver Teaching:  Level of patient/caregiver understanding able to:   [] Indicates understanding       [] Needs reinforcement  [] Unsuccessful      [] Verbal Understanding  [] Demonstrated understanding       [] No evidence of learning  [] Refused teaching         [] N/A       Electronically signed by Nile Godfrey RN, [address] on 4/4/2019 at 12:09 PM

## 2019-04-04 NOTE — PROGRESS NOTES
Intake/Output Summary (Last 24 hours) at 4/4/2019 1514  Last data filed at 4/4/2019 1429  Gross per 24 hour   Intake 1440 ml   Output 875 ml   Net 565 ml       General: Alert and oriented. Resting in bed in no apparent distress. HEENT: Normocephalic. Atraumatic. Pupils equal and reactive. EOM intact. Oral mucosa pink/moist/intact. Neck: Supple. Symmetrical. Trachea midline. Lungs: Clear to auscultation bilaterally. Respirations even and unlabored. Chest: Exam unremarkable. Cardiac: S1/S2 noted. Regular Rhythm and rate. Abdomen/GI: Soft. Non-tender.BS+.round  Extremities: PP+. Atraumatic. No redness/cyanosis noted. Brisk cap refill. Bilateral lower extremity pitting edema noted left greater than right, scales noted with clear yellow drainage, improved, erythema also improved  Skin: Dry and intact. No lesions noted. Neuro: Grossly intact. No focal deficits noted. LABS:    Lab Results   Component Value Date    WBC 3.3 (L) 04/04/2019    HGB 12.0 (L) 04/04/2019    HCT 37.3 (L) 04/04/2019    MCV 86.5 04/04/2019     04/04/2019    LYMPHOPCT 11.0 04/04/2019    RBC 4.31 04/04/2019    MCH 27.8 04/04/2019    MCHC 32.1 04/04/2019    RDW 19.2 (H) 04/04/2019       Lab Results   Component Value Date    CREATININE 0.6 (L) 04/04/2019    BUN 7 04/04/2019     04/04/2019    K 4.2 04/04/2019     04/04/2019    CO2 26 04/04/2019       Lab Results   Component Value Date    MG 2.10 04/04/2019       Lab Results   Component Value Date    ALT 49 (H) 04/01/2019    AST 82 (H) 04/01/2019    ALKPHOS 102 04/01/2019    BILITOT 1.6 (H) 04/01/2019        No flowsheet data found. Imaging:  US GUIDED PARACENTESIS   Final Result   Successful ultrasound guided paracentesis.          VL Extremity Venous Bilateral   Final Result          Assessment & Plan:      Bilateral lower extremity redness with swelling- Likely Venous stasis dermatitis with superimposed cellulitis   - Wound care was consulted- drsg applied, will

## 2019-04-04 NOTE — CARE COORDINATION
Instructed to schedule wound clinic appt for this pt for ongoing care and f/u for venous stasis LE. Pt reluctant to allow me to schedule as he states it is difficult to get transport. In the end he believes that he can get here via bus. Appt sched for next Tuesday. Also xifaxin rx requires precert. [address] has started this process. They believe that auth should be through by next Tuesday for him to pick it up at that time. Informed NP and she is agreeable to him waiting until next week to get. Electronically signed by Namrata Caro RN on 4/4/2019 at 2:48 PM    Message sent to Dr Yunier Mcclain requesting antibiotic rx prior to pharmacy closing  Electronically signed by Namrata Caro RN on 4/4/2019 at 2:52 PM    Paper rx for augmentin sent to retail per pts nurse, per Dr Yunier Mcclain to remain for another night IVAB and can dc in am. Nurse already aware.  Sent message to NP  Electronically signed by Namrata Caro RN on 4/4/2019 at 3:03 PM

## 2019-04-04 NOTE — PROGRESS NOTES
Infectious Disease Follow up Notes  Admit Date: 4/1/2019  Hospital Day: 4    Antibiotics : IV Vancomycin  IV Ceftriaxone      CHIEF COMPLAINT:     Bi lateral lower leg cellulitis  Open wounds  Lower leg edema  Cirrhosis of liver  Ascites    Subjective interval History :  46 y.o. man with cirrhosis of liver and lower leg swelling on going weeping drainage and pain pt tried hydrogen peroxide to help heal the wound and control pain resulted in more skin injury now has crusting and flaking and redness spreading up the legs and in lot of pain. He also noticed his abdomen more distension and he had paracentesis in the past from cirrhosis of liver. He has leukopenia and thrombocytopenia from cirrhosis and splenomegaly and he has h/o umbilical hernia repair. Lower leg pain a bit better less weeping redness going down and Ascitic fluid cx negative tolerating IV abx ok and leg ace wraps helping the edema           Past Medical History:    Past Medical History:   Diagnosis Date    Cirrhosis ([facility] [address]        Past Surgical History:    Past Surgical History:   Procedure Laterality Date    HERNIA REPAIR         Current Medications:    Outpatient Medications Marked as Taking for the 4/1/19 encounter [facility] Encounter)   Medication Sig Dispense Refill    lactulose (CHRONULAC) 10 GM/15ML solution Take 20 g by mouth 3 times daily      pantoprazole (PROTONIX) 40 MG tablet Take 40 mg by mouth daily      nadolol (CORGARD) 20 MG tablet Take 20 mg by mouth daily      vitamin B-1 (THIAMINE) 100 MG tablet Take 100 mg by mouth daily      spironolactone (ALDACTONE) 100 MG tablet Take 100 mg by mouth daily      rifaximin (XIFAXAN) 550 MG tablet Take 550 mg by mouth 2 times daily      furosemide (LASIX) 40 MG tablet Take 40 mg by mouth daily         Allergies:  Patient has no known allergies. Immunizations :    There is no immunization history on file for this patient. Social History:    Social History     Tobacco Use    Smoking status: Former Smoker    Smokeless tobacco: Never Used   Substance Use Topics    Alcohol use: No    Drug use: No     Social History     Tobacco Use   Smoking Status Former Smoker   Smokeless Tobacco Never Used      Family history : no DVT no COPD        REVIEW OF SYSTEMS:    No fever / chills / sweats. No weight loss. No visual change, eye pain, eye discharge. No oral lesion, sore throat, dysphagia. Denies cough / sputum/Sob   Denies chest pain, palpitations/ dizziness  Denies nausea/ vomiting/abdominal pain/diarrhea. Denies dysuria or change in urinary function. Denies joint swelling or pain. No myalgia, arthralgia. No rashes, skin lesions   Denies focal weakness, sensory change or other neurologic symptoms  No lymph node swelling or tenderness.     LOWER Leg cellulitis and open ulcers+ drainage_ leg pain+  Ascites+    PHYSICAL EXAM:      Vitals:    BP (!) 97/59   Pulse 57   Temp 98.4 °F (36.9 °C) (Oral)   Resp 18   Ht 5' 10\" (1.778 m)   Wt 203 lb 4.2 oz (92.2 kg)   SpO2 94%   BMI 29.17 kg/m²     General Appearance: alert,in some+ acute distress, +  pallor, no icterus skin changes from cirrhosis of liver    Skin: warm and dry, no rash or erythema  Head: normocephalic and atraumatic  Eyes: pupils equal, round, and reactive to light, conjunctivae normal  ENT: tympanic membrane, external ear and ear canal normal bilaterally, nose without deformity, nasal mucosa and turbinates normal without polyps  Neck: supple and non-tender without mass, no thyromegaly  no cervical lymphadenopathy  Pulmonary/Chest: clear to auscultation bilaterally- no wheezes, rales or rhonchi, normal air movement, no respiratory distress  Cardiovascular: normal rate, regular rhythm, normal S1 and S2, no murmurs, rubs, clicks, or gallops, no carotid bruits  Abdomen: soft, ++-distended, normal bowel sounds, no masses or organomegaly umbilical hernia repair scar+ s/p drainage of the  ASCITES+   Extremities: no cyanosis, clubbing or ++  edema  Musculoskeletal: normal range of motion, no joint swelling, deformity or tenderness  Neurologic: reflexes normal and symmetric, no cranial nerve deficit  Psych:  Orientation, sensorium, mood normal  Lines: IV  Bi lateral lower leg edema and skin flaking off yellow cursting with on going local warmth cellulitis resolving and improving slowly  and chronic skin changes from edema pigmentation                       Data Review:    Lab Results   Component Value Date    WBC 3.3 (L) 04/04/2019    HGB 12.0 (L) 04/04/2019    HCT 37.3 (L) 04/04/2019    MCV 86.5 04/04/2019     04/04/2019     Lab Results   Component Value Date    CREATININE 0.6 (L) 04/04/2019    BUN 7 04/04/2019     04/04/2019    K 4.2 04/04/2019     04/04/2019    CO2 26 04/04/2019       Hepatic Function Panel:   Lab Results   Component Value Date    ALKPHOS 102 04/01/2019    ALT 49 04/01/2019    AST 82 04/01/2019    PROT 6.7 04/01/2019    PROT 7.3 10/06/2011    BILITOT 1.6 04/01/2019    BILIDIR 3.10 07/27/2011    IBILI 1.9 07/27/2011    LABALBU 2.3 04/04/2019       UA:  Lab Results   Component Value Date    COLORU DK YELLOW 06/08/2015    CLARITYU Clear 06/08/2015    GLUCOSEU Negative 06/08/2015    GLUCOSEU Negative 07/25/2011    BILIRUBINUR Negative 06/08/2015    BILIRUBINUR Moderate 07/25/2011    KETUA Negative 06/08/2015    SPECGRAV 1.012 06/08/2015    BLOODU SMALL 06/08/2015    PHUR 7.0 06/08/2015    PHUR 6.0 06/08/2015    PROTEINU Negative 06/08/2015    UROBILINOGEN 1.0 06/08/2015    NITRU Negative 06/08/2015    LEUKOCYTESUR Negative 06/08/2015    LABMICR YES 06/08/2015    URINETYPE NON SPECIFIED 06/08/2015      Urine Microscopic:   Lab Results   Component Value Date    BACTERIA Rare 07/25/2011    HYALCAST 1 06/08/2015    WBCUA 0 06/08/2015    RBCUA 11 06/08/2015    EPIU 0 06/08/2015     Alb  1.9     Creat  0.6     INR  1.66     MICRO: cultures reviewed and updated by me            Body Fluid Culture [[id number]] Collected: 04/03/19 1045   Order Status: Sent Specimen: Body Fluid from Ascitic Fluid Updated: 04/03/19 1201   Culture Blood #1 [[id number]] Collected: 04/01/19 1849   Order Status: Completed Specimen: Blood Updated: 04/02/19 1915    Blood Culture, Routine No Growth to date.  Any change in status will be called. Narrative:     ORDER#: [id number]                          ORDERED BY: [employer]  SOURCE: Blood Hand, Left                   COLLECTED:  04/01/19 18:49  ANTIBIOTICS AT [place]RI[place]. :                      RECEIVED :  04/01/19 18:52  If child <=2 yrs old please draw pediatric bottle. ~Blood Culture #1  Performed at:  [facility]  [address], [place] [address]   Phone [phone]   Culture Blood #2 [[id number]] Collected: 04/01/19 1849   Order Status: Completed Specimen: Blood Updated: 04/02/19 1915    Culture, Blood 2 No Growth to date.  Any change in status will be called. Narrative:     ORDER#: [id number]                          ORDERED BY: [employer]  SOURCE: Blood Antecubital-Right            COLLECTED:  04/01/19 18:49  ANTIBIOTICS AT [place]RI[place]N. :                      RECEIVED :  04/01/19 18:53  If child <=2 yrs old please draw pediatric bottle. ~Blood Culture #2  Performed at:  [facility]  [address], [place] [address]   Phone [phone]     Results for Wili Diesel (MRN [id number]) as of 4/3/2019 13:53   Ref.  Range 4/3/2019 10:45   Source + CELL CT/DIFF-BF Unknown Paracentesis   Appearance, Fluid Unknown Hazy   Color, Fluid Unknown Yellow   Fluid Type Unknown Ascitic Fluid   LD, Fluid Latest Ref Range: Not Established U/L 60   RBC, Fluid Latest Units: /cumm 426   Lymphocytes, Body Fluid Latest Units: % 53   Monocyte Count, Fluid Latest Units: % 36   Neutrophil Count, Fluid Latest Units: % 7   Nucl Cell, Fluid Latest Units: /cumm 75 Protein, Fluid Latest Ref Range: Not Established g/dL 1.4   Albumin, Fluid Latest Ref Range: Not Established g/dL 0.6   Clot Eval. Unknown see below   Number of Cells Counted Fluid Unknown 100       IMAGING:    US GUIDED PARACENTESIS   Final Result   Successful ultrasound guided paracentesis. VL Extremity Venous Bilateral   Final Result        TECHNIQUE:  Informed consent was obtained after a detailed explanation of the procedure  including risks, benefits, and alternatives.  Universal protocol was  followed. Elis [place] left abdomen was prepped and draped in sterile fashion and  local anesthesia was achieved with lidocaine.  A 5 [place] needle sheath was  advanced under ultrasound guidance into ascites and paracentesis was  performed.  The patient tolerated the procedure well. FINDINGS:  A total of 2.7 L of yellow fluid was removed.      Impression:       Successful ultrasound guided paracentesis. Impressions  Right Impression  No evidence of deep vein or superficial vein thrombosis involving the right  lower extremity. Calf and ankle edema noted. Calf veins were not well visualized on the right. Left Impression  No evidence of deep vein or superficial vein thrombosis involving the left  lower extremity. Calf and ankle edema noted. Calf veins were not well visualized on the left. Conclusions      Summary      Normal venous duplex examination of the legs bilaterally with normal venous   Doppler signals noted throughout. No evidence of thrombophlebitis is noted   bilaterally in the deep and superficial veins of the legs. Small calf   thrombi cannot be excluded because of the severe edema involving the lower   extremities.  Calf veins are not well visualized      Signature      ------------------------------------------------------------------   Electronically signed by Kwame Carey MD   (Interpreting physician) on 04/02/2019 at 03:36 PM   ------------------------------------------------------------------    All the pertinent images and reports for the current Hospitalization were reviewed by me     Scheduled Meds:   furosemide  40 mg Oral Daily    gabapentin  100 mg Oral TID    vancomycin  1,000 mg Intravenous Q12H    cefTRIAXone (ROCEPHIN) IV  2 g Intravenous Q24H    vitamin B-1  100 mg Oral Daily    spironolactone  100 mg Oral Daily    rifaximin  550 mg Oral BID    pantoprazole  40 mg Oral Daily    nadolol  20 mg Oral Daily    lactulose  20 g Oral TID    sodium chloride flush  10 mL Intravenous 2 times per day       Continuous Infusions:      PRN Meds:  diphenhydrAMINE, oxyCODONE-acetaminophen, sodium chloride flush, magnesium hydroxide, ondansetron, acetaminophen      Assessment:   Bi lateral lower leg edema   Chronic skin changes  Secondary infection and cellulitis from Irritant use   H/o Blistering lesions reported by pt likely from edema  Cirrhosis of liver  H/o Hep C+v  H/o Alcohol abuse   Pancytopenia from above  Ascites++  S/p IR drainage   Umbilical hernia repair  H/o Varices+  Luis feto protein last check Oct 2018 negative  He has h/o Hepatic encephalopathy in the past       S/p paracentesis and no SBP by fluid analysis and Lower leg pain going down and cellulitis slow to resolve and tolerating ace wraps and local care     Labs, Microbiology, Radiology and all the pertinent results from current hospitalization and  care every where were reviewed  by me as a part of the evaluation   Plan:   1. Cont local care  2. Elevate the legs  3. Cont IV Vancomycin x 1 gm x Q 12 HRS as in patient till tomorrow  4. Ascitic fluid cell counts noted   5. Will cover with IV  Ceftriaxone  2 gm for gram -ve till tomorrow  6. He is not following with GI regularly per patient   7. Home on oral Augmentin x875 mg bid x 5 days scripts done   8. [id number] Lynne Lopez for d/c in AM per primary team - d/w RN and      Discussed with patient/Family d/w RN  D/w RN    Thanks for allowing me to participate in your patient's care and please call me with any questions or concerns.     Reece Ron MD  Infectious Disease  [facility]) Physician  Phone: [phone]   Fax : [phone]

## 2019-04-04 NOTE — PLAN OF CARE
Problem: Pain:  Goal: Control of acute pain  Description  Control of acute pain  Outcome: Ongoing  Note:   D: PT. With complaint of pain to bilateral LEs  A: medicated with Percocet 2 tabs @ 1951  R: resting quietly afterwards

## 2019-04-05 VITALS
BODY MASS INDEX: 29.38 KG/M2 | RESPIRATION RATE: 18 BRPM | SYSTOLIC BLOOD PRESSURE: 119 MMHG | HEIGHT: 70 IN | HEART RATE: 81 BPM | WEIGHT: 205.25 LBS | TEMPERATURE: 97.5 F | DIASTOLIC BLOOD PRESSURE: 71 MMHG | OXYGEN SATURATION: 92 %

## 2019-04-05 LAB
GLUCOSE BLD-MCNC: 127 MG/DL (ref 70–99)
GLUCOSE BLD-MCNC: 70 MG/DL (ref 70–99)
GLUCOSE BLD-MCNC: 71 MG/DL (ref 70–99)
PERFORMED ON: ABNORMAL
PERFORMED ON: NORMAL
PERFORMED ON: NORMAL

## 2019-04-05 PROCEDURE — 2580000003 HC RX 258: Performed by: INTERNAL MEDICINE

## 2019-04-05 PROCEDURE — 6370000000 HC RX 637 (ALT 250 FOR IP): Performed by: HOSPITALIST

## 2019-04-05 PROCEDURE — 94760 N-INVAS EAR/PLS OXIMETRY 1: CPT

## 2019-04-05 PROCEDURE — 6360000002 HC RX W HCPCS: Performed by: INTERNAL MEDICINE

## 2019-04-05 PROCEDURE — 6370000000 HC RX 637 (ALT 250 FOR IP): Performed by: NURSE PRACTITIONER

## 2019-04-05 PROCEDURE — 6370000000 HC RX 637 (ALT 250 FOR IP): Performed by: INTERNAL MEDICINE

## 2019-04-05 RX ADMIN — LACTULOSE 20 G: 20 SOLUTION ORAL at 08:35

## 2019-04-05 RX ADMIN — NADOLOL 20 MG: 20 TABLET ORAL at 10:18

## 2019-04-05 RX ADMIN — FUROSEMIDE 40 MG: 40 TABLET ORAL at 10:18

## 2019-04-05 RX ADMIN — RIFAXIMIN 550 MG: 550 TABLET ORAL at 08:35

## 2019-04-05 RX ADMIN — Medication 100 MG: at 08:35

## 2019-04-05 RX ADMIN — Medication 10 ML: at 08:36

## 2019-04-05 RX ADMIN — GABAPENTIN 100 MG: 100 CAPSULE ORAL at 08:35

## 2019-04-05 RX ADMIN — DIPHENHYDRAMINE HCL 25 MG: 25 TABLET ORAL at 06:13

## 2019-04-05 RX ADMIN — OXYCODONE AND ACETAMINOPHEN 2 TABLET: 5; 325 TABLET ORAL at 06:13

## 2019-04-05 RX ADMIN — PANTOPRAZOLE SODIUM 40 MG: 40 TABLET, DELAYED RELEASE ORAL at 08:34

## 2019-04-05 RX ADMIN — OXYCODONE AND ACETAMINOPHEN 2 TABLET: 5; 325 TABLET ORAL at 10:18

## 2019-04-05 RX ADMIN — SPIRONOLACTONE 100 MG: 25 TABLET ORAL at 08:34

## 2019-04-05 RX ADMIN — VANCOMYCIN HYDROCHLORIDE 1000 MG: 1 INJECTION, POWDER, LYOPHILIZED, FOR SOLUTION INTRAVENOUS at 03:16

## 2019-04-05 ASSESSMENT — PAIN DESCRIPTION - DESCRIPTORS
DESCRIPTORS: PINS AND NEEDLES;BURNING
DESCRIPTORS: BURNING
DESCRIPTORS: BURNING

## 2019-04-05 ASSESSMENT — PAIN SCALES - GENERAL
PAINLEVEL_OUTOF10: 0
PAINLEVEL_OUTOF10: 7
PAINLEVEL_OUTOF10: 6
PAINLEVEL_OUTOF10: 5
PAINLEVEL_OUTOF10: 0
PAINLEVEL_OUTOF10: 6
PAINLEVEL_OUTOF10: 7

## 2019-04-05 ASSESSMENT — PAIN DESCRIPTION - ONSET: ONSET: PROGRESSIVE

## 2019-04-05 ASSESSMENT — PAIN DESCRIPTION - PROGRESSION: CLINICAL_PROGRESSION: GRADUALLY WORSENING

## 2019-04-05 ASSESSMENT — PAIN DESCRIPTION - FREQUENCY
FREQUENCY: INTERMITTENT

## 2019-04-05 ASSESSMENT — PAIN - FUNCTIONAL ASSESSMENT: PAIN_FUNCTIONAL_ASSESSMENT: ACTIVITIES ARE NOT PREVENTED

## 2019-04-05 ASSESSMENT — PAIN DESCRIPTION - LOCATION
LOCATION: LEG

## 2019-04-05 ASSESSMENT — PAIN DESCRIPTION - PAIN TYPE
TYPE: ACUTE PAIN

## 2019-04-05 ASSESSMENT — PAIN DESCRIPTION - ORIENTATION: ORIENTATION: RIGHT;LEFT

## 2019-04-05 NOTE — CARE COORDINATION
Pt tells me that he would prefer a different PCP office, called Dr Timothy Grant office and cancelled. He now has appt 4/11 w/ Aazm Harry NP. Discussed xifaxin preauth pending and that he will need to return Tues to  xifaxin. Called pharmacy and asked that other meds be delivered. Pts copay 40.00 and he initially states that he has this, but is concerned that its not being run through his rx coverage. Pharmacy tech will review. Pt requesting \"a couple pain meds for the road\" Discussed that MD did not order. Pts nurse indicated that she can have paperwork ready to dc at noon.  He believes that he can have a ride at that time  Electronically signed by Dante Avina RN on 4/5/2019 at 10:36 AM

## 2019-04-05 NOTE — PLAN OF CARE
Problem: Pain:  Goal: Control of acute pain  Description  Control of acute pain  Outcome: Ongoing  Note:   D: Pt.  With complaint of pain to bilateral legs that he rates at an 8 on the pain scale  A: medicated with Percocet 2 tabs @ 2124  R: resting quietly afterwards

## 2019-04-05 NOTE — PROGRESS NOTES
Orders to d/c patient. IV removed- pt tolerated well. AVS information reviewed and signed. Pt acknowledged understanding of information on AVS including: Activity, Diet, New medications, Continued medications, medications to stop taking, follow up appointments, and education on diagnosis. Pt being D/C'd to home.  Waiting on lift to arrive, ambulated off unit

## 2019-04-05 NOTE — DISCHARGE SUMMARY
Hospital Medicine Discharge Summary      Patient ID: Kelvin Gan      Patient's PCP: Obinna Webster MD    Admit Date: 4/1/2019     Discharge Date: 4/5/2019      Admitting Physician: Arely Atkins MD     Discharge Provider: KVNG Cuevas CNP     Discharge Diagnoses: Active Hospital Problems    Diagnosis Date Noted    Bilateral lower leg cellulitis [L03.116, M63.996] 04/01/2019    Liver cirrhosis ([address] [K74.60] 04/01/2019       Disposition:  [x] Home  [] Home with home health [] Rehab [] Psych [] SNF  [] LTAC  [] Long term nursing home or group home [] Transfer to ICU  [] Transfer to PCU [] Other:    Fela Harry MD  [address]  [phone]    Schedule an appointment as soon as possible for a visit in 1 week  for hospital follow up    Pamela Pizarro MD  [address] [facility] [address]  [facility]    Schedule an appointment as soon as possible for a visit  for hospital follow up    [id number] [facility]  [address] [facility] [address]  On 4/9/2019         Discharge Condition: stable      Code Status:  Full Code     Activity: activity as tolerated    Diet: DIET LOW SODIUM 2 GM; 1500 ml     Discharge Medications:     Discharge Medication List as of 4/5/2019 11:11 AM           Details   gabapentin (NEURONTIN) 100 MG capsule Take 1 capsule by mouth 3 times daily for 30 days. , Disp-90 capsule, R-0Normal      amoxicillin-clavulanate (AUGMENTIN) 875-125 MG per tablet Take 1 tablet by mouth 2 times daily for 5 days, Disp-10 tablet, R-0Print              Details   nadolol (CORGARD) 20 MG tablet Take 1 tablet by mouth daily, Disp-30 tablet, R-1Normal      furosemide (LASIX) 40 MG tablet Take 1 tablet by mouth daily, Disp-60 tablet, R-1Normal      spironolactone (ALDACTONE) 100 MG tablet Take 1 tablet by mouth daily, Disp-30 tablet, R-1Normal      lactulose (CHRONULAC) 10 GM/15ML solution Take 30 mLs by mouth 3 times daily, Disp-2700 mL, R-1Normal      rifaximin (XIFAXAN) 550 MG tablet Take 1 tablet by mouth 2 times daily, Disp-60 tablet, R-1Normal      pantoprazole (PROTONIX) 40 MG tablet Take 1 tablet by mouth daily, Disp-30 tablet, R-1Normal      vitamin B-1 (THIAMINE) 100 MG tablet Take 1 tablet by mouth daily, Disp-30 tablet, R-1Normal             The patient was seen and examined on day of discharge and this discharge summary is in conjunction with any daily progress note from day of discharge. Hospital Course: This is a 78-year-old male with a history of liver cirrhosis and alcohol use who presented to the ED with complaints of worsening pain and redness of the bilateral lower extremities. He was evaluated by infectious disease who thought he had a superimposed infection on what looks like chronic venous stasis. He was started on antibiotics. He was also not taking any of his medications at home and Aldactone, Lasix was resumed. Swelling improved. Paracentesis performed with removal of approximately 3 L. Patient was referred to wound care center and discharge. He will continue an additional 5 days of antibiotic therapy at discharge per infectious disease recommendations. At this time he'll be discharged home in stable condition to follow-up as an outpatient. He verbalizes understanding and is in agreement with the plan of care. Medications percent at her pharmacy. Exam:     /71   Pulse 81   Temp 97.5 °F (36.4 °C) (Oral)   Resp 18   Ht 5' 10\" (1.778 m)   Wt 205 lb 4 oz (93.1 kg)   SpO2 92%   BMI 29.45 kg/m²     General: Resting in bed in no apparent distress. HEENT: Normocephalic. Atraumatic. Pupils equal and reactive. EOM intact. Oral mucosa pink/moist/intact. Neck: Supple. Symmetrical. Trachea midline. Lungs: Clear to auscultation bilaterally. Respirations even and unlabored. Chest: Exam unremarkable. Cardiac: S1/S2 noted. Regular Rhythm and rate. Abdomen/GI: Soft. Non-tender. Non-distended. BS+. Extremities: PP+. Atraumatic. No redness/cyanosis noted. Brisk cap refill. Scaly but improved, edema less  Skin: Dry and intact. No lesions noted. Neuro: Grossly intact. No focal deficits noted. Consults:     IP CONSULT TO INFECTIOUS DISEASES    Significant Diagnostic Studies:       Labs: For convenience and continuity at follow-up the following most recent labs are provided:    CBC:    Lab Results   Component Value Date    WBC 3.3 04/04/2019    HGB 12.0 04/04/2019    HCT 37.3 04/04/2019     04/04/2019       Renal:    Lab Results   Component Value Date     04/04/2019    K 4.2 04/04/2019    K 3.7 04/02/2019     04/04/2019    CO2 26 04/04/2019    BUN 7 04/04/2019    CREATININE 0.6 04/04/2019    CALCIUM 7.9 04/04/2019    PHOS 3.2 04/04/2019       Future Appointments   Date Time Provider [facility]   4/9/2019 10:00 AM Lissa Apley, APRN - CNP [facility] None   4/11/2019  1:00 PM KVNG Zhou - CNP [facility]       Time Spent on discharge is more than 30 minutes in the examination, evaluation, counseling and review of medications and discharge plan. RX monitoring reviewed    Signed:    KVNG Goldman CNP   4/5/2019      Thank you Jovany Dorsey MD for the opportunity to be involved in this patient's care. If you have any questions or concerns please feel free to contact me at [phone].

## 2019-04-05 NOTE — DISCHARGE INSTR - DIET

## 2019-04-05 NOTE — PLAN OF CARE
Problem: Safety:  Goal: Free from accidental physical injury  Description  Free from accidental physical injury  Outcome: Ongoing  Pt will be free from injury by using appropriate safety awareness and judgement. Pt will call for help when needed. RN will continue to do safety checks and ask if pt needs help to the bathroom in advance to prevent falls. Problem: Daily Care:  Goal: Daily care needs are met  Description  Daily care needs are met  Outcome: Ongoing  Pt will continue to assist with personal hygiene and needs as tolerated. Pt will state when they needs help and RN/PCA will continue to help perform personal hygiene. RN will assess skin care needs. Problem: Pain:  Goal: Patient's pain/discomfort is manageable  Description  Patient's pain/discomfort is manageable  Outcome: Ongoing  Will continue to use the pain scale (0-10) to measure pt's pain and monitor their needs. Will assess patients pain with assessments and interactions. Pt will notify RN of any changes in pain and where. Will continue to perform therapeutic comfort measures and give pain medications as prescribed/needed. Problem: Skin Integrity:  Goal: Skin integrity will stabilize  Description  Skin integrity will stabilize  Outcome: Ongoing  Pt will continue daily activities as tolerated and alert RN to any pain and skin changes. RN will continue to assess skin condition, note and changes, and take preventative measures to prevent skin breakdown such as movement, foam/silicone dressings on bony prominences, and making sure pt has adequate nutrition.

## 2019-04-06 LAB
BLOOD CULTURE, ROUTINE: NORMAL
BODY FLUID CULTURE, STERILE: NORMAL
CULTURE, BLOOD 2: NORMAL
GRAM STAIN RESULT: NORMAL

## 2019-04-09 ENCOUNTER — HOSPITAL ENCOUNTER (OUTPATIENT)
Dept: WOUND CARE | Age: 49
Discharge: HOME | End: 2019-04-09

## 2019-07-16 ENCOUNTER — HOSPITAL ENCOUNTER (EMERGENCY)
Age: 49
Discharge: HOME | End: 2019-07-16
Attending: EMERGENCY MEDICINE
Payer: MEDICARE

## 2019-07-16 VITALS
DIASTOLIC BLOOD PRESSURE: 76 MMHG | HEART RATE: 96 BPM | BODY MASS INDEX: 30.14 KG/M2 | TEMPERATURE: 98.3 F | HEIGHT: 70 IN | WEIGHT: 210.54 LBS | RESPIRATION RATE: 16 BRPM | OXYGEN SATURATION: 95 % | SYSTOLIC BLOOD PRESSURE: 123 MMHG

## 2019-07-16 DIAGNOSIS — L03.115 BILATERAL LOWER LEG CELLULITIS: Primary | ICD-10-CM

## 2019-07-16 DIAGNOSIS — L03.116 BILATERAL LOWER LEG CELLULITIS: Primary | ICD-10-CM

## 2019-07-16 LAB
A/G RATIO: 0.8 (ref 1.1–2.2)
ALBUMIN SERPL-MCNC: 2.9 G/DL (ref 3.4–5)
ALP BLD-CCNC: 124 U/L (ref 40–129)
ALT SERPL-CCNC: 61 U/L (ref 10–40)
ANION GAP SERPL CALCULATED.3IONS-SCNC: 12 MMOL/L (ref 3–16)
AST SERPL-CCNC: 112 U/L (ref 15–37)
BASOPHILS ABSOLUTE: 0.1 K/UL (ref 0–0.2)
BASOPHILS RELATIVE PERCENT: 1.5 %
BILIRUB SERPL-MCNC: 2.7 MG/DL (ref 0–1)
BUN BLDV-MCNC: 4 MG/DL (ref 7–20)
CALCIUM SERPL-MCNC: 8.2 MG/DL (ref 8.3–10.6)
CHLORIDE BLD-SCNC: 105 MMOL/L (ref 99–110)
CO2: 22 MMOL/L (ref 21–32)
CREAT SERPL-MCNC: 0.6 MG/DL (ref 0.9–1.3)
EOSINOPHILS ABSOLUTE: 0.1 K/UL (ref 0–0.6)
EOSINOPHILS RELATIVE PERCENT: 3.6 %
GFR AFRICAN AMERICAN: >60
GFR NON-AFRICAN AMERICAN: >60
GLOBULIN: 3.5 G/DL
GLUCOSE BLD-MCNC: 103 MG/DL (ref 70–99)
HCT VFR BLD CALC: 38.1 % (ref 40.5–52.5)
HEMOGLOBIN: 12.7 G/DL (ref 13.5–17.5)
LYMPHOCYTES ABSOLUTE: 0.6 K/UL (ref 1–5.1)
LYMPHOCYTES RELATIVE PERCENT: 17.7 %
MCH RBC QN AUTO: 31.7 PG (ref 26–34)
MCHC RBC AUTO-ENTMCNC: 33.3 G/DL (ref 31–36)
MCV RBC AUTO: 95.4 FL (ref 80–100)
MONOCYTES ABSOLUTE: 0.5 K/UL (ref 0–1.3)
MONOCYTES RELATIVE PERCENT: 14.7 %
NEUTROPHILS ABSOLUTE: 2.3 K/UL (ref 1.7–7.7)
NEUTROPHILS RELATIVE PERCENT: 62.5 %
PDW BLD-RTO: 16.9 % (ref 12.4–15.4)
PLATELET # BLD: 100 K/UL (ref 135–450)
PMV BLD AUTO: 10 FL (ref 5–10.5)
POTASSIUM SERPL-SCNC: 3.5 MMOL/L (ref 3.5–5.1)
RBC # BLD: 4 M/UL (ref 4.2–5.9)
SODIUM BLD-SCNC: 139 MMOL/L (ref 136–145)
TOTAL PROTEIN: 6.4 G/DL (ref 6.4–8.2)
WBC # BLD: 3.6 K/UL (ref 4–11)

## 2019-07-16 PROCEDURE — 80053 COMPREHEN METABOLIC PANEL: CPT

## 2019-07-16 PROCEDURE — 99283 EMERGENCY DEPT VISIT LOW MDM: CPT

## 2019-07-16 PROCEDURE — 85025 COMPLETE CBC W/AUTO DIFF WBC: CPT

## 2019-07-16 RX ORDER — DOXYCYCLINE 100 MG/1
100 TABLET ORAL 2 TIMES DAILY
Qty: 14 TABLET | Refills: 0 | Status: SHIPPED | OUTPATIENT
Start: 2019-07-16 | End: 2019-07-23

## 2019-07-16 ASSESSMENT — PAIN SCALES - GENERAL: PAINLEVEL_OUTOF10: 0

## 2019-07-17 NOTE — ED NOTES
State that if he finds out that we have \"misdiagnosed\" him, he is going to call his  and nunu the hospital.     Christy Triplett RN  07/16/19 0255